# Patient Record
Sex: FEMALE | Race: WHITE | NOT HISPANIC OR LATINO | Employment: FULL TIME | URBAN - METROPOLITAN AREA
[De-identification: names, ages, dates, MRNs, and addresses within clinical notes are randomized per-mention and may not be internally consistent; named-entity substitution may affect disease eponyms.]

---

## 2018-02-13 ENCOUNTER — OFFICE VISIT (OUTPATIENT)
Dept: URGENT CARE | Facility: CLINIC | Age: 38
End: 2018-02-13
Payer: COMMERCIAL

## 2018-02-13 VITALS
HEIGHT: 65 IN | OXYGEN SATURATION: 99 % | SYSTOLIC BLOOD PRESSURE: 131 MMHG | BODY MASS INDEX: 29.69 KG/M2 | HEART RATE: 65 BPM | WEIGHT: 178.2 LBS | DIASTOLIC BLOOD PRESSURE: 94 MMHG | TEMPERATURE: 99.5 F | RESPIRATION RATE: 18 BRPM

## 2018-02-13 DIAGNOSIS — B00.1 HERPES LABIALIS: Primary | ICD-10-CM

## 2018-02-13 PROCEDURE — G0381 LEV 2 HOSP TYPE B ED VISIT: HCPCS | Performed by: FAMILY MEDICINE

## 2018-02-13 PROCEDURE — S9083 URGENT CARE CENTER GLOBAL: HCPCS | Performed by: FAMILY MEDICINE

## 2018-02-13 RX ORDER — VALACYCLOVIR HYDROCHLORIDE 1 G/1
1000 TABLET, FILM COATED ORAL 2 TIMES DAILY
Qty: 2 TABLET | Refills: 3 | Status: SHIPPED | OUTPATIENT
Start: 2018-02-13 | End: 2020-02-03 | Stop reason: ALTCHOICE

## 2018-02-13 NOTE — PROGRESS NOTES
[address] [place] [address] [address]     Patient Information: Deb Hanley  MRN: [id number] : 1980  Encounter: [id number]    HPI:  Patient presents with cold sores of both her upper and lower lip  She states symptoms started 1-2 days ago  She is concerned when because when she had cold sores in the past she had that with a sinus infection  She denies any significant head pressure  She has had a history of cold sores in the past   She also complains of a painful sore on her upper palate  She has been using saltwater gargles which has been helping slightly  She admits to smoking occasionally  Past history is otherwise unremarkable    Subjective:   Review of Systems   Constitutional: Negative  HENT: Negative for congestion, ear discharge, ear pain, hearing loss, rhinorrhea, sinus pain, sinus pressure, sore throat, tinnitus, trouble swallowing and voice change  Eyes: Negative  Respiratory: Negative  Cardiovascular: Negative  Gastrointestinal: Negative  Genitourinary: Negative  Musculoskeletal: Negative  Skin: Positive for rash  Cold sores   Neurological: Negative  Hematological: Negative  Objective:  /94 (BP Location: Right arm, Patient Position: Sitting, Cuff Size: Adult)   Pulse 65   Temp 99 5 °F (37 5 °C) (Tympanic)   Resp 18   Ht 5' 5" (1 651 m)   Wt 80 8 kg (178 lb 3 2 oz)   SpO2 99%   BMI 29 65 kg/m²   Physical Exam   Constitutional: She is oriented to person, place, and time  She appears well-developed  HENT:   Head: Normocephalic  Right Ear: External ear normal    Left Ear: External ear normal    Small aphthous ulcer noted roof of the mouth  Eyes: EOM are normal  Pupils are equal, round, and reactive to light  Neck: Normal range of motion  Neck supple  No thyromegaly present  Cardiovascular: Normal rate, regular rhythm, normal heart sounds and intact distal pulses  Pulmonary/Chest: Effort normal and breath sounds normal    Abdominal: Soft  Bowel sounds are normal  There is no tenderness  Musculoskeletal: Normal range of motion  Neurological: She is alert and oriented to person, place, and time  She has normal reflexes  Skin: Skin is warm and dry  Vesicular rash left upper lip and right lower lip consistent with herpes simplex  Psychiatric: She has a normal mood and affect  Vitals reviewed  Assessment:  Diagnoses and all orders for this visit:    Herpes labialis  -     valACYclovir (VALTREX) 1,000 mg tablet; Take 1 tablet (1,000 mg total) by mouth 2 (two) times a day for 1 day    Other orders  -     norethindrone-ethinyl estradiol-iron (ESTROSTEP FE) 1-20/1-30/1-35 MG-MCG TABS; Take 1 tablet by mouth daily        Plan:  Patient Instructions   Taking Valtrex as directed  May use saltwater gargles or Chloraseptic spray for mouth ulcers  Follow with your family doctor in 3-4 days if symptoms persist or worsen  Kyaelizabeth Guevara, DO  2/13/2018,10:17 AM    Portions of the record may have been created with voice recognition software   Occasional wrong word or "sound a like" substitutions may have occurred due to the inherent limitations of voice recognition software   Read the chart carefully and recognize, using context, where substitutions have occurred

## 2018-02-13 NOTE — PATIENT INSTRUCTIONS
Taking Valtrex as directed  May use saltwater gargles or Chloraseptic spray for mouth ulcers  Follow with your family doctor in 3-4 days if symptoms persist or worsen

## 2020-02-03 ENCOUNTER — OFFICE VISIT (OUTPATIENT)
Dept: URGENT CARE | Facility: CLINIC | Age: 40
End: 2020-02-03
Payer: COMMERCIAL

## 2020-02-03 VITALS
RESPIRATION RATE: 16 BRPM | HEART RATE: 76 BPM | OXYGEN SATURATION: 99 % | HEIGHT: 65 IN | BODY MASS INDEX: 30.82 KG/M2 | SYSTOLIC BLOOD PRESSURE: 134 MMHG | DIASTOLIC BLOOD PRESSURE: 74 MMHG | TEMPERATURE: 100.2 F | WEIGHT: 185 LBS

## 2020-02-03 DIAGNOSIS — B00.1 HERPES LABIALIS: ICD-10-CM

## 2020-02-03 DIAGNOSIS — J06.9 VIRAL URI: Primary | ICD-10-CM

## 2020-02-03 PROCEDURE — 99213 OFFICE O/P EST LOW 20 MIN: CPT | Performed by: EMERGENCY MEDICINE

## 2020-02-03 RX ORDER — VALACYCLOVIR HYDROCHLORIDE 1 G/1
1000 TABLET, FILM COATED ORAL 2 TIMES DAILY
Qty: 14 TABLET | Refills: 1 | Status: SHIPPED | OUTPATIENT
Start: 2020-02-03 | End: 2020-02-10

## 2020-02-03 RX ORDER — FEXOFENADINE HCL AND PSEUDOEPHEDRINE HCI 60; 120 MG/1; MG/1
1 TABLET, EXTENDED RELEASE ORAL 2 TIMES DAILY
COMMUNITY

## 2020-02-03 NOTE — PROGRESS NOTES
[facility]        NAME: Simone Essex is a 46 y o  female  : 1980    MRN: [id number]  DATE: 2020  TIME: 11:41 AM    Assessment and Plan   Viral URI [J06 9]  1  Viral URI     2  Herpes labialis  valACYclovir (VALTREX) 1,000 mg tablet         Patient Instructions     Patient Instructions     You have been diagnosed with a Viral Upper Respiratory infection and your symptoms should resolve over the next 7 to 10 days with the treatments recommended today  If they do not, it is possible that you have developed a bacterial infection and you should return  If you were to take an antibiotic while you are still in the viral stage, you will not get better any faster, but could kill off the good germs in your body as well as make the germs in you resistant to the antibiotic  Take an expectorant - guaifenesin should be the only ingredient - during the day, and the cough suppressant (ex  Robitussin DM or Tessalon) if needed at night only  Take Zinc 12 5 to 15 mg every 2 - 3 hrs while awake for the next few days  You may take Cold Geoffrey (13 3 mg of Zinc) or split a 25 mg Zinc tablet or lozenge in two or a 50 mg into four to get the proper dose  The total daily dose of Zinc should exceed 75 mg per day  You may also take a decongestant like Sudafed, unless you have hypertension or cardiac disease  Hold any NSAIDs like Ibuprofen (Advil), Naprosyn (Aleve), etc while on steroids like Medrol or Prednisone  If you are diabetic, you should also adhere strictly to your diet and monitor your blood sugar closely while on the steroids as discussed  Fever in Adults   AMBULATORY CARE:   A fever  is an increase in your body temperature  Normal body temperature is 98 6°F (37°C)  Fever is generally defined as greater than 100 4°F (38°C)  Common causes include an infection, injury, or disease such as arthritis    Other signs and symptoms may include any of the following:   · Chills and shivers     · Muscle stiffness    · Weight loss    · Night sweats    · Fever that comes and goes    · Fever that is higher in the morning  Seek care immediately if:   · Your fever does not go away or gets worse even after treatment  · You have a stiff neck and a bad headache  · You are confused  You may not be able to think clearly or remember things like you normally do  · Your heart beats faster than usual even after treatment  · You have shortness of breath or chest pain when you breathe  · You urinate small amounts or not at all  · Your skin, lips, or nails turn blue  Contact your healthcare provider if:   · You have abdominal pain or you feel bloated  · You have nausea or are vomiting  · You have pain or burning when you urinate, or you have pain in your back  · You have questions or concerns about your condition or care  Treatment for a fever  may include any of the following:  · NSAIDs , such as ibuprofen, help decrease swelling, pain, and fever  This medicine is available with or without a doctor's order  NSAIDs can cause stomach bleeding or kidney problems in certain people  If you take blood thinner medicine, always ask if NSAIDs are safe for you  Always read the medicine label and follow directions  Do not give these medicines to children under 10months of age without direction from your child's healthcare provider  · Acetaminophen  decreases pain and fever  It is available without a doctor's order  Ask how much to take and how often to take it  Follow directions  Read the labels of all other medicines you are using to see if they also contain acetaminophen, or ask your doctor or pharmacist  Acetaminophen can cause liver damage if not taken correctly  Do not use more than 4 grams (4,000 milligrams) total of acetaminophen in one day  · Antibiotics  may be given if you have an infection caused by bacteria  · Take your medicine as directed    Contact your healthcare provider if you think your medicine is not helping or if you have side effects  Tell him of her if you are allergic to any medicine  Keep a list of the medicines, vitamins, and herbs you take  Include the amounts, and when and why you take them  Bring the list or the pill bottles to follow-up visits  Carry your medicine list with you in case of an emergency  Self-care:   · Drink more liquids as directed  A fever makes you sweat  This can increase your risk for dehydration  Liquids can help prevent dehydration  ¨ Drink at least 6 to 8 eight-ounce cups of clear liquids each day  Drink water, juice, or broth  Do not drink sports drinks  They may contain caffeine  ¨ Ask your healthcare provider if you should drink an oral rehydration solution (ORS)  An ORS has the right amounts of water, salts, and sugar you need to replace body fluids  · Dress in lightweight clothes  Shivers may be a sign that your fever is rising  Do not put extra blankets or clothes on  This may cause your fever to rise even higher  Dress in light, comfortable clothing  Use a lightweight blanket or sheet when you sleep  Change your clothes, blanket, or sheets if they get wet  · Cool yourself safely  Take a bath in cool or lukewarm water  Use an ice pack wrapped in a small towel or wet a washcloth with cool water  Place the ice pack or wet washcloth on your forehead or the back of your neck  Follow up with your healthcare provider as directed:  Write down your questions so you remember to ask them during your visits  © 2017 [employer] Information is for End User's use only and may not be sold, redistributed or otherwise used for commercial purposes  All illustrations and images included in CareNotes® are the copyrighted property of [employer] , [employer]  or [employer] Martínez  The above information is an  only  It is not intended as medical advice for individual conditions or treatments   Talk to your doctor, nurse or pharmacist before following any medical regimen to see if it is safe and effective for you  Oral Herpes Simplex Virus Infections   WHAT YOU NEED TO KNOW:   Oral herpes simplex virus (HSV) infections cause sores to form on the mouth, lips, or gums  HSV has 2 types  Oral HSV infections are most often caused by HSV type 1  HSV type 2 normally affects the genital area, but may also occur in the mouth  After you are infected, the virus hides in your nerves and may return  An HSV infection that comes back is also known as a cold sore  DISCHARGE INSTRUCTIONS:   Medicines:   · Antiviral medicine: This decreases symptoms and shortens the amount of time blisters are present  You may also need to take it daily to prevent blisters  The medicine may be given as a liquid, pill, or ointment  Use as directed  · Numbing medicine: This decreases mouth pain  It is usually given as a mouth rinse  Use it before you eat or drink, or as directed  Follow up with your healthcare provider as directed:  Write down your questions so you remember to ask them during your visits  Self-care:   · Eat soft, bland foods:  Avoid salty, acidic, spicy, sharp-edged, and hard foods  Eat healthy foods to help healing  · Drink liquids:  Cool liquids may help soothe your mouth and numb the pain  Avoid citrus or carbonated drinks, such as orange or grapefruit juice, lemonade, or soda  These liquids may cause your mouth to hurt more  A straw may help if you have blisters on the lips or tongue  · Use ice:  Ice helps decrease swelling and pain  Drink cold water or suck on ice to help decrease pain on your tongue or inside your mouth  Use an ice pack, or put crushed ice in a plastic bag on your lip  Cover it with a towel and place it on your lip for 15 to 20 minutes every hour or as directed    Prevent the spread of the herpes simplex virus:   · Do not have close contact with people until the blisters heal  This includes touching, kissing, and oral sex  · Do not get close to babies or to people who are sick while you have cold sores  · Do not share eating utensils, towels, lip balm, or makeup with another person  · Do not touch the blisters or pick at the scabs  Do not touch other body parts, especially your eyes or genitals without washing your hands first  Wash your hands often  Contact your healthcare provider if:   · You have a fever  · Your symptoms become worse or do not improve a week after you start treatment  · You have difficulty eating or drinking because of the pain in your mouth  · You get a headache, are nauseated, or vomit  · Your eyes feel irritated, or you feel like you have something in your eye  · Your skin becomes itchy, swollen, or develops a rash after you take your medicine  · You have questions or concerns about your condition or care  Return to the emergency department if:   · You get a fever, feel achy, or see pus instead of clear fluid in the sores  · You get sores on your eyes  · You have abdominal pain, a severe headache, or confusion  · You get new symptoms, or old symptoms return after you have been treated  © 2017 [employer]  Information is for End User's use only and may not be sold, redistributed or otherwise used for commercial purposes  All illustrations and images included in CareNotes® are the copyrighted property of [employer] , [employer]  or Shadi Martínez  The above information is an  only  It is not intended as medical advice for individual conditions or treatments  Talk to your doctor, nurse or pharmacist before following any medical regimen to see if it is safe and effective for you  Follow up with PCP in 3-5 days  Proceed to  ER if symptoms worsen      Chief Complaint     Chief Complaint   Patient presents with    Cough     fever since yesterday, scratchy throat         History of Present Illness       Patient with fevers sore throat and congestion since yesterday  She also complains of recurrence of cold sores on her lower lip that occurs when she gets viral infections  Review of Systems   Review of Systems   Constitutional: Negative for chills and fever  HENT: Positive for congestion, rhinorrhea, sinus pressure and sore throat  Negative for trouble swallowing and voice change  Respiratory: Positive for cough  Negative for chest tightness, shortness of breath and wheezing  Cardiovascular: Negative for chest pain  Current Medications       Current Outpatient Medications:     fexofenadine-pseudoephedrine (ALLEGRA-D)  MG per tablet, Take 1 tablet by mouth 2 (two) times a day, Disp: , Rfl:     norethindrone-ethinyl estradiol-iron (ESTROSTEP FE) 1-20/1-30/1-35 MG-MCG TABS, Take 1 tablet by mouth daily, Disp: , Rfl:     valACYclovir (VALTREX) 1,000 mg tablet, Take 1 tablet (1,000 mg total) by mouth 2 (two) times a day for 7 days, Disp: 14 tablet, Rfl: 1    Current Allergies     Allergies as of 2020 - Reviewed 2020   Allergen Reaction Noted    Doxycycline GI Intolerance 2020            The following portions of the patient's history were reviewed and updated as appropriate: allergies, current medications, past family history, past medical history, past social history, past surgical history and problem list      Past Medical History:   Diagnosis Date    Known health problems: none        Past Surgical History:   Procedure Laterality Date     SECTION      DILATION AND CURETTAGE OF UTERUS      x11       Family History   Problem Relation Age of Onset    No Known Problems Mother     Liver disease Father          Medications have been verified          Objective   /74   Pulse 76   Temp 100 2 °F (37 9 °C)   Resp 16   Ht 5' 5" (1 651 m)   Wt 83 9 kg (185 lb)   LMP 01/10/2020   SpO2 99%   BMI 30 79 kg/m²        Physical Exam     Physical Exam   Constitutional: She is oriented to person, place, and time  She appears well-developed and well-nourished  No distress  HENT:   Head: Normocephalic and atraumatic  Right Ear: Tympanic membrane and external ear normal    Left Ear: Tympanic membrane and external ear normal    Nose: Mucosal edema present  Mouth/Throat: Posterior oropharyngeal erythema present  No oropharyngeal exudate or tonsillar abscesses  Nasal mucosa congested, oropharynx mildly erythematous  Neck: Neck supple  Cardiovascular: Normal rate and regular rhythm  Pulmonary/Chest: Effort normal  No respiratory distress  She has no wheezes  She has no rales  Neurological: She is alert and oriented to person, place, and time  Skin: Skin is warm and dry  Papulovesicular lesions on lower lip   Psychiatric: She has a normal mood and affect  Her behavior is normal  Judgment and thought content normal    Nursing note and vitals reviewed

## 2020-02-03 NOTE — PATIENT INSTRUCTIONS
You have been diagnosed with a Viral Upper Respiratory infection and your symptoms should resolve over the next 7 to 10 days with the treatments recommended today  If they do not, it is possible that you have developed a bacterial infection and you should return  If you were to take an antibiotic while you are still in the viral stage, you will not get better any faster, but could kill off the good germs in your body as well as make the germs in you resistant to the antibiotic  Take an expectorant - guaifenesin should be the only ingredient - during the day, and the cough suppressant (ex  Robitussin DM or Tessalon) if needed at night only  Take Zinc 12 5 to 15 mg every 2 - 3 hrs while awake for the next few days  You may take Cold Geoffrey (13 3 mg of Zinc) or split a 25 mg Zinc tablet or lozenge in two or a 50 mg into four to get the proper dose  The total daily dose of Zinc should exceed 75 mg per day  You may also take a decongestant like Sudafed, unless you have hypertension or cardiac disease  Hold any NSAIDs like Ibuprofen (Advil), Naprosyn (Aleve), etc while on steroids like Medrol or Prednisone  If you are diabetic, you should also adhere strictly to your diet and monitor your blood sugar closely while on the steroids as discussed  Fever in Adults   AMBULATORY CARE:   A fever  is an increase in your body temperature  Normal body temperature is 98 6°F (37°C)  Fever is generally defined as greater than 100 4°F (38°C)  Common causes include an infection, injury, or disease such as arthritis  Other signs and symptoms may include any of the following:   · Chills and shivers     · Muscle stiffness    · Weight loss    · Night sweats    · Fever that comes and goes    · Fever that is higher in the morning  Seek care immediately if:   · Your fever does not go away or gets worse even after treatment  · You have a stiff neck and a bad headache  · You are confused   You may not be able to think clearly or remember things like you normally do  · Your heart beats faster than usual even after treatment  · You have shortness of breath or chest pain when you breathe  · You urinate small amounts or not at all  · Your skin, lips, or nails turn blue  Contact your healthcare provider if:   · You have abdominal pain or you feel bloated  · You have nausea or are vomiting  · You have pain or burning when you urinate, or you have pain in your back  · You have questions or concerns about your condition or care  Treatment for a fever  may include any of the following:  · NSAIDs , such as ibuprofen, help decrease swelling, pain, and fever  This medicine is available with or without a doctor's order  NSAIDs can cause stomach bleeding or kidney problems in certain people  If you take blood thinner medicine, always ask if NSAIDs are safe for you  Always read the medicine label and follow directions  Do not give these medicines to children under 10months of age without direction from your child's healthcare provider  · Acetaminophen  decreases pain and fever  It is available without a doctor's order  Ask how much to take and how often to take it  Follow directions  Read the labels of all other medicines you are using to see if they also contain acetaminophen, or ask your doctor or pharmacist  Acetaminophen can cause liver damage if not taken correctly  Do not use more than 4 grams (4,000 milligrams) total of acetaminophen in one day  · Antibiotics  may be given if you have an infection caused by bacteria  · Take your medicine as directed  Contact your healthcare provider if you think your medicine is not helping or if you have side effects  Tell him of her if you are allergic to any medicine  Keep a list of the medicines, vitamins, and herbs you take  Include the amounts, and when and why you take them  Bring the list or the pill bottles to follow-up visits   Carry your medicine list with you in case of an emergency  Self-care:   · Drink more liquids as directed  A fever makes you sweat  This can increase your risk for dehydration  Liquids can help prevent dehydration  ¨ Drink at least 6 to 8 eight-ounce cups of clear liquids each day  Drink water, juice, or broth  Do not drink sports drinks  They may contain caffeine  ¨ Ask your healthcare provider if you should drink an oral rehydration solution (ORS)  An ORS has the right amounts of water, salts, and sugar you need to replace body fluids  · Dress in lightweight clothes  Shivers may be a sign that your fever is rising  Do not put extra blankets or clothes on  This may cause your fever to rise even higher  Dress in light, comfortable clothing  Use a lightweight blanket or sheet when you sleep  Change your clothes, blanket, or sheets if they get wet  · Cool yourself safely  Take a bath in cool or lukewarm water  Use an ice pack wrapped in a small towel or wet a washcloth with cool water  Place the ice pack or wet washcloth on your forehead or the back of your neck  Follow up with your healthcare provider as directed:  Write down your questions so you remember to ask them during your visits  © 2017 [facility] Information is for End User's use only and may not be sold, redistributed or otherwise used for commercial purposes  All illustrations and images included in CareNotes® are the copyrighted property of [employer]  or Shadi Martínez  The above information is an  only  It is not intended as medical advice for individual conditions or treatments  Talk to your doctor, nurse or pharmacist before following any medical regimen to see if it is safe and effective for you  Oral Herpes Simplex Virus Infections   WHAT YOU NEED TO KNOW:   Oral herpes simplex virus (HSV) infections cause sores to form on the mouth, lips, or gums  HSV has 2 types   Oral HSV infections are most often caused by HSV type 1  HSV type 2 normally affects the genital area, but may also occur in the mouth  After you are infected, the virus hides in your nerves and may return  An HSV infection that comes back is also known as a cold sore  DISCHARGE INSTRUCTIONS:   Medicines:   · Antiviral medicine: This decreases symptoms and shortens the amount of time blisters are present  You may also need to take it daily to prevent blisters  The medicine may be given as a liquid, pill, or ointment  Use as directed  · Numbing medicine: This decreases mouth pain  It is usually given as a mouth rinse  Use it before you eat or drink, or as directed  Follow up with your healthcare provider as directed:  Write down your questions so you remember to ask them during your visits  Self-care:   · Eat soft, bland foods:  Avoid salty, acidic, spicy, sharp-edged, and hard foods  Eat healthy foods to help healing  · Drink liquids:  Cool liquids may help soothe your mouth and numb the pain  Avoid citrus or carbonated drinks, such as orange or grapefruit juice, lemonade, or soda  These liquids may cause your mouth to hurt more  A straw may help if you have blisters on the lips or tongue  · Use ice:  Ice helps decrease swelling and pain  Drink cold water or suck on ice to help decrease pain on your tongue or inside your mouth  Use an ice pack, or put crushed ice in a plastic bag on your lip  Cover it with a towel and place it on your lip for 15 to 20 minutes every hour or as directed  Prevent the spread of the herpes simplex virus:   · Do not have close contact with people until the blisters heal  This includes touching, kissing, and oral sex  · Do not get close to babies or to people who are sick while you have cold sores  · Do not share eating utensils, towels, lip balm, or makeup with another person  · Do not touch the blisters or pick at the scabs   Do not touch other body parts, especially your eyes or genitals without washing your hands first  Wash your hands often  Contact your healthcare provider if:   · You have a fever  · Your symptoms become worse or do not improve a week after you start treatment  · You have difficulty eating or drinking because of the pain in your mouth  · You get a headache, are nauseated, or vomit  · Your eyes feel irritated, or you feel like you have something in your eye  · Your skin becomes itchy, swollen, or develops a rash after you take your medicine  · You have questions or concerns about your condition or care  Return to the emergency department if:   · You get a fever, feel achy, or see pus instead of clear fluid in the sores  · You get sores on your eyes  · You have abdominal pain, a severe headache, or confusion  · You get new symptoms, or old symptoms return after you have been treated  © 2017 [facility] Information is for End User's use only and may not be sold, redistributed or otherwise used for commercial purposes  All illustrations and images included in CareNotes® are the copyrighted property of [employer]  or Shadi Martínez  The above information is an  only  It is not intended as medical advice for individual conditions or treatments  Talk to your doctor, nurse or pharmacist before following any medical regimen to see if it is safe and effective for you

## 2020-02-03 NOTE — LETTER
February 3, 2020     Patient: Shiv Garcia   YOB: 1980   Date of Visit: 2/3/2020       To Whom it May Concern:    Shiv Garcia was seen in my clinic on 2/3/2020  She may return to work on 02/04/2020  If you have any questions or concerns, please don't hesitate to call           Sincerely,          Mehreen Goel MD        CC: No Recipients

## 2023-02-22 ENCOUNTER — OFFICE VISIT (OUTPATIENT)
Dept: BARIATRICS | Facility: CLINIC | Age: 43
End: 2023-02-22

## 2023-02-22 VITALS
HEIGHT: 64 IN | SYSTOLIC BLOOD PRESSURE: 126 MMHG | DIASTOLIC BLOOD PRESSURE: 80 MMHG | WEIGHT: 213.4 LBS | BODY MASS INDEX: 36.43 KG/M2 | HEART RATE: 92 BPM

## 2023-02-22 DIAGNOSIS — I10 BENIGN ESSENTIAL HTN: ICD-10-CM

## 2023-02-22 DIAGNOSIS — Z91.89 AT RISK FOR SLEEP APNEA: ICD-10-CM

## 2023-02-22 DIAGNOSIS — E66.9 OBESITY, CLASS II, BMI 35-39.9: Primary | ICD-10-CM

## 2023-02-22 DIAGNOSIS — K21.9 GERD (GASTROESOPHAGEAL REFLUX DISEASE): ICD-10-CM

## 2023-02-22 DIAGNOSIS — R73.01 ELEVATED FASTING GLUCOSE: ICD-10-CM

## 2023-02-22 PROBLEM — Z15.89 HOMOZYGOUS MTHFR MUTATION C677T: Status: ACTIVE | Noted: 2017-02-28

## 2023-02-22 PROBLEM — E78.2 MODERATE MIXED HYPERLIPIDEMIA NOT REQUIRING STATIN THERAPY: Status: ACTIVE | Noted: 2022-06-03

## 2023-02-22 PROBLEM — E66.812 OBESITY, CLASS II, BMI 35-39.9: Status: ACTIVE | Noted: 2023-02-22

## 2023-02-22 RX ORDER — OMEPRAZOLE 20 MG/1
CAPSULE, DELAYED RELEASE ORAL
COMMUNITY
Start: 2023-02-13

## 2023-02-22 RX ORDER — DESOXIMETASONE 2.5 MG/G
OINTMENT TOPICAL
COMMUNITY
Start: 2023-01-31

## 2023-02-22 RX ORDER — LEVONORGESTREL / ETHINYL ESTRADIOL 0.15-0.03
KIT ORAL
COMMUNITY
Start: 2023-01-14

## 2023-02-22 RX ORDER — LISINOPRIL 10 MG/1
TABLET ORAL
COMMUNITY
Start: 2023-01-17

## 2023-02-22 NOTE — ASSESSMENT & PLAN NOTE
- Taking lisinopril  May improve with weight loss and lifestyle modification  Continue management with prescribing provider

## 2023-02-22 NOTE — ASSESSMENT & PLAN NOTE
- Discussed options of HealthyCORE-Intensive Lifestyle Intervention Program, Very Low Calorie Diet-VLCD, Conservative Program, Gladis-En-Y Gastric Bypass and Vertical Sleeve Gastrectomy and the role of weight loss medications   - Not currently interested in weight loss surgery, but might consider in the future  - Patient is interested in pursuing Conservative Program   -She is interested in weight loss medications  She will check coverage of weight loss medications and update me   -She denies history of seizures or kidney stones   -She was recently diagnosed with glaucoma and will be starting eyedrops  She follows with Dr Bola Hernandez of [facility] [place]  Would need clearance from him before considering Topamax or Wellbutrin  - Patient denies personal history of pancreatitis  Patient also denies personal and family history of thyroid cancer and multiple endocrine neoplasia type 2 (MEN 2 tumor)  - Initial weight loss goal of 5-10% weight loss for improved health  - Weight loss can improve patient's co-morbid conditions and/or prevent weight-related complications  - On OCP  - Stop Willodean Chick 4/8  - Labs reviewed: Lipid panel and CMP completed December 29, 2022  Cholesterol, triglycerides, LDL, and glucose elevated, HDL low, which may all improve with weight loss and cardiovascular exercise  The remainder of the blood work was within acceptable range  TSH completed May 18, 2022 was within normal limits   -Check A1c and fasting insulin  Goals:  Do not skip meals  Food log (ie ) www myfitnesspal com,sparkpeople  com,loseit com,calorieking  com,etc  baritastic (use skinnytaste  com, dietdoctor  com or smartphone rosa [employer] for recipes)  No sugary beverages  At least 64oz of water daily  Increase physical activity by 10 minutes daily   Gradually increase physical activity to a goal of 5 days per week for 30 minutes of MODERATE intensity PLUS 2 days per week of FULL BODY resistance training (use smartphone apps FitON, Home Workout, etc )  Start logging, weighing, and measuring food, including alcohol  Be mindful of alcohol intake  1200 jeffy/day  Sample menu given  Keep up the great work with water intake  Start exercise, such as walking 2 days/week for 10 minutes with gradual increase to 5 days/week for 30 minutes  Smoking cessation recommended for her general health and wellbeing

## 2023-02-22 NOTE — ASSESSMENT & PLAN NOTE
- Stop bang 4/8    - Risks of untreated sleep apnea reviewed, including increased risk for myocardial infarction and stroke, increased difficulty with weight loss, and risk of sudden cardiac death due to arrhythmia   - Sleep medicine referral declined  She will contact me if she changes her mind

## 2023-02-22 NOTE — ASSESSMENT & PLAN NOTE
- Taking Prilosec  May improve with weight loss and lifestyle modification  Continue management with prescribing provider

## 2023-02-22 NOTE — PROGRESS NOTES
Assessment/Plan:    Obesity, Class II, BMI 35-39 9  - Discussed options of HealthyCORE-Intensive Lifestyle Intervention Program, Very Low Calorie Diet-VLCD, Conservative Program, Gladis-En-Y Gastric Bypass and Vertical Sleeve Gastrectomy and the role of weight loss medications   - Not currently interested in weight loss surgery, but might consider in the future  - Patient is interested in pursuing Conservative Program   -She is interested in weight loss medications  She will check coverage of weight loss medications and update me   -She denies history of seizures or kidney stones   -She was recently diagnosed with glaucoma and will be starting eyedrops  She follows with Dr Dandre Burgos of [facility] [place]  Would need clearance from him before considering Topamax or Wellbutrin  - Patient denies personal history of pancreatitis  Patient also denies personal and family history of thyroid cancer and multiple endocrine neoplasia type 2 (MEN 2 tumor)  - Initial weight loss goal of 5-10% weight loss for improved health  - Weight loss can improve patient's co-morbid conditions and/or prevent weight-related complications  - On OCP  - Stop Marcial Scheuermann 4/8  - Labs reviewed: Lipid panel and CMP completed December 29, 2022  Cholesterol, triglycerides, LDL, and glucose elevated, HDL low, which may all improve with weight loss and cardiovascular exercise  The remainder of the blood work was within acceptable range  TSH completed May 18, 2022 was within normal limits   -Check A1c and fasting insulin  Goals:  Do not skip meals  Food log (ie ) www myfitnesspal com,sparkpeople  com,loseit com,calorieking  com,etc  baritastic (use skinnytaste  com, dietdoctor  com or smartphone rosa Verbling for recipes)  No sugary beverages  At least 64oz of water daily  Increase physical activity by 10 minutes daily   Gradually increase physical activity to a goal of 5 days per week for 30 minutes of MODERATE intensity PLUS 2 days per week of FULL BODY resistance training (use smartphone apps FitON, Home Workout, etc )  Start logging, weighing, and measuring food, including alcohol  Be mindful of alcohol intake  1200 jeffy/day  Sample menu given  Keep up the great work with water intake  Start exercise, such as walking 2 days/week for 10 minutes with gradual increase to 5 days/week for 30 minutes  Smoking cessation recommended for her general health and wellbeing  Benign essential HTN  - Taking lisinopril  May improve with weight loss and lifestyle modification  Continue management with prescribing provider  GERD (gastroesophageal reflux disease)  - Taking Prilosec  May improve with weight loss and lifestyle modification  Continue management with prescribing provider  At risk for sleep apnea  - Stop bang 4/8    - Risks of untreated sleep apnea reviewed, including increased risk for myocardial infarction and stroke, increased difficulty with weight loss, and risk of sudden cardiac death due to arrhythmia   - Sleep medicine referral declined  She will contact me if she changes her mind  Marialuisa Jean was seen today for follow-up  Diagnoses and all orders for this visit:    Obesity, Class II, BMI 35-39 9  -     HEMOGLOBIN A1C W/ EAG ESTIMATION; Future  -     Insulin, fasting; Future    Elevated fasting glucose  -     HEMOGLOBIN A1C W/ EAG ESTIMATION; Future  -     Insulin, fasting; Future    Benign essential HTN    GERD (gastroesophageal reflux disease)    At risk for sleep apnea            Follow up in approximately 3 months with Non-Surgical Physician/Advanced Practitioner  Subjective:   Chief Complaint   Patient presents with   • Follow-up       Patient ID: Zenia Alejandre  is a 40 y o  female with excess weight/obesity here to pursue weight management  Previous notes and records have been reviewed      Past Medical History:   Diagnosis Date   • Hypertension    • Known health problems: none      Past Surgical History:   Procedure Laterality Date   •  SECTION     • DILATION AND CURETTAGE OF UTERUS      x11       HPI:  Wt Readings from Last 20 Encounters:   23 96 8 kg (213 lb 6 4 oz)   20 83 9 kg (185 lb)   18 80 8 kg (178 lb 3 2 oz)     Obesity/Excess Weight:  Severity: Moderate  Onset:  Since about , but worse past 2 years    Modifiers: Diet and Exercise and shakes and Be Balance program  Contributing factors: Poor Food Choices, Pregnancy and PCOS  Associated symptoms: decreased self esteem, increased shortness of breath and clothes do not fit    Hydration: 90 oz water, sometimes crystal light  Alcohol: 10 drinks per week   Smoking: cigarettes 5-10 per week  Exercise: none  Occupation: working from home    Sleep: 8 hours  STOP ban/8    Highest weight: current  Current weight: 213 4 lbs BMI 36 40  Goal weight: just to be comfortable     Colonoscopy: N/A  Mammogram: UTD, due Aug 2023, gets through [facility]    The following portions of the patient's history were reviewed and updated as appropriate: allergies, current medications, past family history, past medical history, past social history, past surgical history, and problem list     Family History   Problem Relation Age of Onset   • Hypertension Mother    • Hypertension Father    • Diabetes Father    • Liver disease Father    • Stroke Father    • Hypertension Sister    • Breast cancer Maternal Grandmother    • Lung cancer Maternal Grandfather    • Cirrhosis Maternal Grandfather    • Heart disease Paternal Grandmother    • Colon cancer Paternal Grandmother    • Thyroid disease Neg Hx         Review of Systems   HENT: Negative for sore throat  Respiratory: Positive for cough (feels it is related to GERD)  Negative for shortness of breath  Cardiovascular: Negative for chest pain and palpitations     Gastrointestinal: Negative for constipation, diarrhea, nausea and vomiting         + GERD improving with medication Endocrine: Positive for heat intolerance (comes and goes)  Negative for cold intolerance  Genitourinary: Negative for dysuria  Musculoskeletal: Positive for arthralgias (intermittent) and back pain (intermittent )  Skin: Negative for rash  Neurological: Negative for headaches  Psychiatric/Behavioral: Negative for suicidal ideas (or HI)  + depression and anxiety controlled       Objective:  /80   Pulse 92   Ht 5' 4 2" (1 631 m)   Wt 96 8 kg (213 lb 6 4 oz)   BMI 36 40 kg/m²     Physical Exam  Vitals and nursing note reviewed  Constitutional   General appearance: Abnormal   well developed and obese  Eyes No conjunctival injection  Ears, Nose, Mouth, and Throat Oral mucosa moist    Pulmonary   Respiratory effort: No increased work of breathing or signs of respiratory distress  Cardiovascular     Examination of extremities for edema and/or varicosities: Normal   no edema  Abdomen   Abdomen: Abnormal   The abdomen was obese      Musculoskeletal   Normal range of motion  Neurological   Gait and station: Normal     Psychiatric   Orientation to person, place and time: Normal     Affect: appropriate

## 2023-02-23 ENCOUNTER — APPOINTMENT (OUTPATIENT)
Dept: LAB | Facility: CLINIC | Age: 43
End: 2023-02-23

## 2023-02-23 ENCOUNTER — PATIENT MESSAGE (OUTPATIENT)
Dept: BARIATRICS | Facility: CLINIC | Age: 43
End: 2023-02-23

## 2023-02-23 DIAGNOSIS — R73.01 ELEVATED FASTING GLUCOSE: ICD-10-CM

## 2023-02-23 DIAGNOSIS — E66.9 OBESITY, CLASS II, BMI 35-39.9: ICD-10-CM

## 2023-02-23 DIAGNOSIS — Z91.89 AT RISK FOR SLEEP APNEA: Primary | ICD-10-CM

## 2023-02-23 LAB
EST. AVERAGE GLUCOSE BLD GHB EST-MCNC: 80 MG/DL
HBA1C MFR BLD: 4.4 %
INSULIN SERPL-ACNC: 13.3 MU/L (ref 3–25)

## 2023-02-23 NOTE — TELEPHONE ENCOUNTER
Camron Lawrence 2/23/2023 2:11 PM EST      ----- Message -----  From: Love Stefan  Sent: 2/23/2023 2:02 PM EST  To: , *  Subject: follow up     Good Afternoon, I got the bloodwork done this morning  My insurance doesn't cover either medication and it would be $1500 per month out of pocket  I was able to sign up for a savings card on [employer] but the link for [facility] PAT didn't go through  I'm not sure how much it would be with the savings card though  I will go ahead and do the sleep study if you could get that process started  I called my insurance regarding the surgery and the girl I talked to couldn't find any prerequisites to having it done so I guess I would need to make an appt with them to start that process and go from there once I have the sleep study done

## 2023-02-27 ENCOUNTER — TELEPHONE (OUTPATIENT)
Dept: BARIATRICS | Facility: CLINIC | Age: 43
End: 2023-02-27

## 2023-02-27 NOTE — TELEPHONE ENCOUNTER
----- Message from Sabina Valdaez sent at 2/24/2023  5:04 PM EST -----  Please let the patient know I have reviewed her A1C and fasting insulin and they were both within normal range

## 2023-03-22 ENCOUNTER — OFFICE VISIT (OUTPATIENT)
Dept: BARIATRICS | Facility: CLINIC | Age: 43
End: 2023-03-22

## 2023-03-22 VITALS
DIASTOLIC BLOOD PRESSURE: 90 MMHG | HEART RATE: 74 BPM | BODY MASS INDEX: 35.7 KG/M2 | HEIGHT: 65 IN | WEIGHT: 214.3 LBS | SYSTOLIC BLOOD PRESSURE: 120 MMHG

## 2023-03-22 DIAGNOSIS — E66.9 OBESITY, UNSPECIFIED: Primary | ICD-10-CM

## 2023-03-22 DIAGNOSIS — E66.9 OBESITY, CLASS II, BMI 35-39.9: Primary | ICD-10-CM

## 2023-03-22 RX ORDER — FEXOFENADINE HCL 180 MG/1
TABLET ORAL
COMMUNITY
Start: 2022-07-21

## 2023-03-22 RX ORDER — FLUTICASONE PROPIONATE 50 MCG
2 SPRAY, SUSPENSION (ML) NASAL
COMMUNITY

## 2023-03-22 RX ORDER — LATANOPROST 50 UG/ML
SOLUTION/ DROPS OPHTHALMIC
COMMUNITY
Start: 2023-02-23

## 2023-03-22 RX ORDER — DESOXIMETASONE 2.5 MG/G
1 OINTMENT TOPICAL
COMMUNITY
Start: 2023-01-31 | End: 2024-01-31

## 2023-03-22 RX ORDER — CETIRIZINE HYDROCHLORIDE, PSEUDOEPHEDRINE HYDROCHLORIDE 5; 120 MG/1; MG/1
TABLET, FILM COATED, EXTENDED RELEASE ORAL
COMMUNITY

## 2023-03-22 RX ORDER — OMEPRAZOLE 20 MG/1
20 TABLET, DELAYED RELEASE ORAL
COMMUNITY
Start: 2023-01-04

## 2023-03-22 NOTE — PROGRESS NOTES
Bariatric Nutrition Assessment Note    Type of surgery    Preop  Surgery Date: TBD  Surgeon: Dr Rosibel Ruiz  40 y o   female     Wt with BMI of 25: 148#  Pre-Op Excess Wt: 66#  Blood pressure 120/90, pulse 74, height 5' 4 5" (1 638 m), weight 97 2 kg (214 lb 4 8 oz)  Body mass index is 36 22 kg/m²     [place] Jeor Equation-2150 kcals to maintain wt                        Estimated protein needs 67-80g                        Estimated fluid needs >80 oz    Weight History   Onset of Obesity: Adult  Family history of obesity: Yes  Wt Loss Attempts: FAD Diets (Cabbage soup, Grapefruit, Cleanse, etc )  Meal Replacements (Medifast, Slim Fast, etc )  Self Created Diets (Portion Control, Healthy Food Choices, etc )  Maximum Wt Lost: 20# on Be Balanced (like Keto)    Review of History and Medications   Past Medical History:   Diagnosis Date   • Hypertension    • Known health problems: none      Past Surgical History:   Procedure Laterality Date   •  SECTION     • DILATION AND CURETTAGE OF UTERUS      x11     Social History     Socioeconomic History   • Marital status: /Civil Union     Spouse name: None   • Number of children: None   • Years of education: None   • Highest education level: None   Occupational History   • None   Tobacco Use   • Smoking status: Some Days     Types: Cigarettes   • Smokeless tobacco: Never   Vaping Use   • Vaping Use: Some days   Substance and Sexual Activity   • Alcohol use: Yes     Comment: SOCIAL   • Drug use: Never   • Sexual activity: None   Other Topics Concern   • None   Social History Narrative   • None     Social Determinants of Health     Financial Resource Strain: Not on file   Food Insecurity: Not on file   Transportation Needs: Not on file   Physical Activity: Not on file   Stress: Not on file   Social Connections: Not on file   Intimate Partner Violence: Not on file   Housing Stability: Not on file       Current Outpatient Medications:   •  desoximetasone (TOPICORT) 0 25 % ointment, , Disp: , Rfl:   •  fexofenadine (ALLEGRA) 180 MG tablet, , Disp: , Rfl:   •  fluticasone (FLONASE) 50 mcg/act nasal spray, 2 sprays, Disp: , Rfl:   •  Jolessa 0 15-0 03 MG per tablet, , Disp: , Rfl:   •  latanoprost (XALATAN) 0 005 % ophthalmic solution, , Disp: , Rfl:   •  lisinopril (ZESTRIL) 10 mg tablet, , Disp: , Rfl:   •  omeprazole (PriLOSEC OTC) 20 MG tablet, Take 20 mg by mouth, Disp: , Rfl:   •  cetirizine-pseudoephedrine (ZyrTEC-D) 5-120 MG per tablet, , Disp: , Rfl:   •  desoximetasone (TOPICORT) 0 25 % ointment, Apply 1 application  topically (Patient not taking: Reported on 3/22/2023), Disp: , Rfl:   •  fexofenadine-pseudoephedrine (ALLEGRA-D)  MG per tablet, Take 1 tablet by mouth 2 (two) times a day, Disp: , Rfl:   •  norethindrone-ethinyl estradiol-iron (ESTROSTEP FE) 1-20/1-30/1-35 MG-MCG TABS, Take 1 tablet by mouth daily, Disp: , Rfl:   •  omeprazole (PriLOSEC) 20 mg delayed release capsule, , Disp: , Rfl:   •  valACYclovir (VALTREX) 1,000 mg tablet, Take 1 tablet (1,000 mg total) by mouth 2 (two) times a day for 7 days, Disp: 14 tablet, Rfl: 1  Food Intake and Lifestyle Assessment   Food Intake Assessment completed via usual diet recall  Breakfast: skips, trying Protein shakes (Fairlife)  Snack: 0   Lunch: leftovers or rotisserie chicken on wrap, or chicken salad on crackers  Snack: 0 or [place] yogurt  Dinner: protein and vegetable, occ starch  Snack: 0  Beverage intake: water  Protein supplement: sometimes a Fairlife  Estimated protein intake per day: 80g on day of shake  Estimated fluid intake per day: 90 oz  Meals eaten away from home: at least 3 times/week  Typical meal pattern: 2-3 meals per day and 0-1 snacks per day  Eating Behaviors: Large portion sizes  Food allergies or intolerances:    Allergies   Allergen Reactions   • Doxycycline GI Intolerance     Cultural or Restorationist considerations: N/A    Physical Assessment  Physical Activity  Types of exercise: None  Current physical limitations: none    Psychosocial Assessment   Support systems: spouse sister in law(had WLS 1 year ago)  Socioeconomic factors: Lives with  and son, works from home    Nutrition Diagnosis  Diagnosis: Overweight / Obesity (NC-3 3)  Related to: Physical inactivity and Excessive energy intake  As Evidenced by: BMI >25     Nutrition Prescription: Recommend the following diet  Low fat, Low sugar and High protein    Interventions and Teaching   Discussed pre-op and post-op nutrition guidelines  Patient educated and handouts provided  Surgical changes to stomach / GI  Capacity of post-surgery stomach  Diet progression  Adequate hydration  Sugar and fat restriction to decrease "dumping syndrome"  Fat restriction to decrease steatorrhea  Expected weight loss  Weight loss plateaus/ possibility of weight regain  Exercise  Suggestions for pre-op diet  Nutrition considerations after surgery  Protein supplements  Meal planning and preparation  Appropriate carbohydrate, protein, and fat intake, and food/fluid choices to maximize safe weight loss, nutrient intake, and tolerance   Dietary and lifestyle changes  Possible problems with poor eating habits  Intuitive eating  Techniques for self monitoring and keeping daily food journal  Potential for food intolerance after surgery, and ways to deal with them including: lactose intolerance, nausea, reflux, vomiting, diarrhea, food intolerance, appetite changes, gas  Vitamin / Mineral supplementation of Multivitamin with minerals and Vitamin D  Post-operative pregnancy guidelines    Education provided to: patient    Barriers to learning: No barriers identified  Readiness to change: preparation    Prior research on procedure: discussed with provider    Comprehension: verbalizes understanding     Expected Compliance: good  Recommendations  Pt is an appropriate candidate for surgery   Yes  Evaluation / Monitoring  Dietitian to Monitor: Eating pattern as discussed Body weight Lab values Physical activity    Goals  Food journal, Complete lession plans 1-6, Eat 3 meals per day and exercise 20 minutes 3 times/week    Time Spent:   1 Hour

## 2023-03-22 NOTE — PROGRESS NOTES
Bariatric Behavioral Health Evaluation  Presenting Problem:  Aminta Lim  is a 40 y o    female   :  1980   Patient presented with overall concerns of obesity  Stated that weight has impacted quality of life and concerned with lack of mobility, chronic pain, and overall health  Patient has attempted various weight loss plans in the past        Patient is Interested in exploring bariatric surgery as an option for  weight loss goals to improve health, increase activity and prevent family disease  YES     Realizes Post- Op Requirements? Yes:  And will learn more through the program while meeting with the dietitian and the   Pre-morbid level of function and history of present illness: Patient has health issues  Living situation: Lives with her spouse and teenage son  Work:  for [employer] that used to be family owned  She works from home  Physical Activity: currently sedentary but is looking forward to hiking and kayaking  Family History:  (Traditions, culture, rules/routines around food): Grew up in [facility] [place] [facility] with 2 older sisters  [place] pedestrian friendly town and she enjoyed active fun  [place] traditions, Lots of starches and sweets  Clean your plate  Current eating habits: Skips breakfast, eats lunch, dinner and swerets after dinner  Tends to overeat rather than save leftovers, cant decide what she wants so over-orders at restraurants  Support system: her sister-in -law, being private about her plan  Spouse is supportive, and parents  Stressed by: traumatic situations in history  Positive experience with counseling  Family history of Obesity: No   Substance abuse: No   Mental health issues: No     Mental Health, Trauma and Substance use Assessment    Psychiatric/Psychological Treatment Diagnosis: Mental Health diagnosis of PTSD  She is not prescribed medication at this time         History of Eating Disorder: Denied    Outpatient Counselor: Currently in therapy with Dr Ebenezer Knox with [facility] psychology  Psychiatrist: No involvement currently  Have you had any Mental Health Higher level of care (ED, PHP or Inpatient Treatment? No      Tobacco/Vaping History: Current use of nicotine products  Informed of nicotine policy and needs nicotine test after 30 days  Drug and/or Alcohol use: has a drink or two two nights a week  Believes she will have minor difficulty no drinking and we will review at next sessions  Have you had any Substance Use Treatment? No      Domestic Violence: No         Abuse or Trauma History: No       Risk Assessment    Identified support system intact  Risk of harm to self or others: None noted during evaluation  No HI/SI    Presence of Audio/Visual Hallucinations: Not reported during evaluation  Access to weapons: Denied    Observation: This appointment only (SW and RD will follow Andre Ennis through the bariatric program)     Based on the previous information, the client presents the following risk of harm to self or others: LOW      Physical/Mental Health Status:          Appearance: appropriate           Sociability: average           Affect: appropriate           Mood: calm           Thought Process: coherent           Speech: normal           Content: no impairment           Orientation: person  Yes , place  Yes , time  Yes , normal attention span  Yes ,              normal memory      Yes   and normal judgement  Yes            Insight: emotional  good       Note :  Patient presented for behavioral health evaluation for the bariatric program  Mental Health diagnosis of PTSD and she is currently involved with  therapy  No involvement with  Psychiatry and never hospitalized and does not take medication  Symptoms are stable at this time  Patient will not be required to have psychiatric evaluation done as s/he has a well established relationship with provider and is compliant with treatment  No Drug and/or Alcohol abuse or treatment  Current nicotine use and is informed of our Nicotine plicy  Patient educated regarding the impact of nicotine and alcohol on the post surgery bariatric patient  Recommendation for surgery are deferred until patient has a negative nicoline test     Goal for next  appointment: Increase activity to walking pad under her desk 5 min at a time  She walks the dogs 45-60 min  when the weather is good  She will consider using the stationary bike or utube videos for bad weather days  Next appointment with 4/27 with me  [id number] [facility]    Patient has received the following education related to the bariatric surgery process and understands:    Patients may be required to complete a psychiatric evaluation and receive clearance for surgery from mental health provider  Patients who undergo weight loss surgery are at higher risk of increased mental health concerns and suicide attempts  Patients may be required to complete a full substance abuse evaluation and then complete all treatment recommendations prior to surgery  If diagnosis of abuse/dependence results, patient may be required to remain sober for one (1) year before having bariatric surgery  Patients on psychiatric medications should check with their provider to discuss psychiatric medications and the changes in absorption  Patient should discuss all time release medications with provider and take all medications as prescribed  The recommendation is that there is no use of any tobacco products, Hookah or vapes for the bariatric post-operation patient  Bariatric surgery patients should not consume alcohol as a post-operative patient as it may increase risk of numerous health conditions including but not limited to alcohol abuse and ulcers  There is a possibility of weight regain if patient does not follow all program guidelines and recommendations      Bariatric surgery patients should exercise thirty (30) to sixty (60) minutes per day to maintain post-surgical weight loss  Research indicates that bariatric patients are more successful when they see a therapist for up to two (2) years post-op  Patients will follow all medical and dietary recommendations provided  Patient will keep all scheduled appointments and follow up with their physician for a minimum of five (5) years  Patient will take all vitamins as recommended  Post-operative vitamins are life-long  There is a goal month set  All requirements should be met by this time  Don't wait to get started! There is a deadline month set  All requirements must be finished by this time and if not, the patient will be halted in the surgery process  The patient can be referred to the medical weight management program or can come back to the surgical program once the unfinished tasks from the previous program are completed  Female patients of childbearing years are informed that pregnancy is not recommended

## 2023-04-04 ENCOUNTER — TELEPHONE (OUTPATIENT)
Dept: CARDIOLOGY CLINIC | Facility: CLINIC | Age: 43
End: 2023-04-04

## 2023-04-27 ENCOUNTER — OFFICE VISIT (OUTPATIENT)
Dept: BARIATRICS | Facility: CLINIC | Age: 43
End: 2023-04-27

## 2023-04-27 DIAGNOSIS — E66.9 OBESITY, CLASS II, BMI 35-39.9: ICD-10-CM

## 2023-04-27 DIAGNOSIS — F17.200 SMOKER: Primary | ICD-10-CM

## 2023-04-27 DIAGNOSIS — E66.9 OBESITY, CLASS II, BMI 35-39.9: Primary | ICD-10-CM

## 2023-04-27 RX ORDER — LEVONORGESTREL AND ETHINYL ESTRADIOL 0.15-0.03
1 KIT ORAL DAILY
COMMUNITY
Start: 2022-05-10 | End: 2023-05-02 | Stop reason: ALTCHOICE

## 2023-04-27 NOTE — PROGRESS NOTES
Sondra Munson  is a 40 y o    female    :  1980  HOLD for nicotine test   Patient presented for Pre-surgical weight check  [address] [place] [address] number      1 of goal  Starting weight:214 3   Submission Target:   maintain Today's weight:      215 9    Discussed preparations for surgery and lifestyle change  Eating behaviors - focused on eating slower  Is in the consciously incompetent stage  Has been having a protein shake after dinner  Drinks plenty of water  Activity - walked a lot on the cruise they were on  That experience strengthened her resolve to have WLS, felt the weight  Continuing to walk at home  Discussed making it part of routine, allergies are difficult, has a treadmill at her desk  Likes having company  Sleep - Likes being in bed, not sleeping to cope  Wonders if the protein shake at night helps with sleep  Coping - mental health is stable with no horrible days  Progress toward program requirements  Workflow:  · Psych and/or D+A Clearance: n/a  · Surgeon Appt : will call after nicotine test  · EGD :   · Support Group: done  · PCP Letter:   · Cardiac Risk Assessment:   · Sleep Studies:   · Bloodwork:   · Nicotine test: needs  · Submission weight goal:    Plan: Eating slower  Next appointment is scheduled for       ANETTE Heck, LCSW  _____________________________

## 2023-04-28 ENCOUNTER — APPOINTMENT (OUTPATIENT)
Dept: LAB | Facility: CLINIC | Age: 43
End: 2023-04-28

## 2023-04-28 ENCOUNTER — PATIENT MESSAGE (OUTPATIENT)
Dept: CARDIOLOGY CLINIC | Facility: CLINIC | Age: 43
End: 2023-04-28

## 2023-04-28 DIAGNOSIS — E66.9 OBESITY, CLASS II, BMI 35-39.9: ICD-10-CM

## 2023-04-28 DIAGNOSIS — F17.200 SMOKER: ICD-10-CM

## 2023-05-02 ENCOUNTER — OFFICE VISIT (OUTPATIENT)
Dept: CARDIOLOGY CLINIC | Facility: CLINIC | Age: 43
End: 2023-05-02

## 2023-05-02 VITALS
DIASTOLIC BLOOD PRESSURE: 88 MMHG | BODY MASS INDEX: 36.65 KG/M2 | HEIGHT: 65 IN | TEMPERATURE: 97.5 F | SYSTOLIC BLOOD PRESSURE: 128 MMHG | WEIGHT: 220 LBS | HEART RATE: 78 BPM | OXYGEN SATURATION: 99 %

## 2023-05-02 DIAGNOSIS — Z01.810 PREOPERATIVE CARDIOVASCULAR EXAMINATION: Primary | ICD-10-CM

## 2023-05-02 DIAGNOSIS — E66.9 OBESITY, CLASS II, BMI 35-39.9: ICD-10-CM

## 2023-05-02 DIAGNOSIS — I10 BENIGN ESSENTIAL HTN: ICD-10-CM

## 2023-05-02 DIAGNOSIS — E78.2 MODERATE MIXED HYPERLIPIDEMIA NOT REQUIRING STATIN THERAPY: ICD-10-CM

## 2023-05-02 RX ORDER — OMEPRAZOLE 40 MG/1
40 CAPSULE, DELAYED RELEASE ORAL DAILY
COMMUNITY

## 2023-05-02 RX ORDER — FUROSEMIDE 20 MG/1
20 TABLET ORAL DAILY PRN
COMMUNITY

## 2023-05-02 NOTE — PATIENT INSTRUCTIONS
Your EKG shows sinus rhythm  I recommend an echocardiogram to assess heart structure and function but this is not needed before surgery  Discuss hydrochlorothiazide with your primary care as this may be a great addition to your lisinopril for optimal BP control and fluid control

## 2023-05-02 NOTE — PROGRESS NOTES
Cardiology Follow Up    Nathan Lizarraga  1980  [id number]  [facility] [place]  [address] [place] [address] [address]  2ND FLOOR  [place]  [facility]  [phone]    Ermelinda Azul presents for pre-operative cardiac evaluation in preparation for bariatric surgery  1  Preoperative cardiovascular examination  Assessment & Plan:  Patient presents for pre-operative cardiac evaluation in preparation for possible bariatric surgery  ECG today shows NSR with possible LVH  She is able to perform > 4 METs  RCRI score of 1 with 6% 30-day risk of death, MI, cardiac arrest   She is without anginal symptoms or evidence of heart failure  She is acceptable cardiac risk to proceed  I do recommend a baseline echocardiogram given her history of hypertension, however this does not need to occur prior to surgery  Recommend pre- and postoperative ECG  2  Benign essential HTN  Assessment & Plan:  History of hypertension which has been controlled with lisinopril 10 mg daily  BP is acceptable today  We discussed that she may benefit from daily HCTZ 12 5 mg for assistance with intermittent edema and blood pressure control  ECG today shows NSR with borderline criteria for LVH  Will proceed with echocardiogram to evaluate heart structure and function  Continue to follow up with PCP for management  Orders:  -     Echo complete w/ contrast if indicated; Future; Expected date: 05/02/2023  -     POCT ECG    3  Moderate mixed hyperlipidemia not requiring statin therapy  Assessment & Plan:  Lipid panel 12/29/2022: C 213  T 161  H 38  L 143   10 year ASCVD score of 4 7  She defers statin therapy at this time as she is pursing weight loss options  I recommend routine monitoring by PCP  4  Obesity, Class II, BMI 35-39 9  Assessment & Plan: Body mass index is 37 18 kg/m²  Currently undergoing work up by bariatrics for possible weight loss surgery           HPI  Ermelinda Azul has a past medical history of hypertension, hyperlipidemia, homozygous MTHFR mutation, obesity, prior tobacco use, and GERD     2023: Henry De La Fuente presents today at the request of bariatrics for pre-operative cardiac evaluation in preparation for possible bariatric surgery  She is hopeful to proceed with surgery later this summer or early   She has a history of high blood pressure first treated in her late 29's  She is maintained on lisinopril 10 mg daily  She has a script for furosemide 20 mg for PRN use for leg edema which she states she has only taken once  She denies chest pain, shortness of breath or dyspnea on exertion, palpitations/heart racing, or lightheadedness  She denies any exertional symptoms  She is currently scheduled to undergo evaluation for sleep apnea  ECG today shows normal sinus rhythm with borderline criteria for LVH  It does not appear that she has undergone cardiac testing in the past  She quit smoking in March  Medical Problems     Problem List     Obesity, Class II, BMI 35-39 9    Benign essential HTN    Moderate mixed hyperlipidemia not requiring statin therapy    Homozygous MTHFR mutation [id number]    GERD (gastroesophageal reflux disease)    At risk for sleep apnea        Past Medical History:   Diagnosis Date   • GERD (gastroesophageal reflux disease)    • Glaucoma    • Hyperlipidemia    • Hypertension    • PCOS (polycystic ovarian syndrome)    • Seasonal allergies      Social History     Socioeconomic History   • Marital status: /Civil Union     Spouse name: Not on file   • Number of children: Not on file   • Years of education: Not on file   • Highest education level: Not on file   Occupational History   • Not on file   Tobacco Use   • Smoking status: Former     Types: Cigarettes     Quit date: 3/16/2023     Years since quittin 1   • Smokeless tobacco: Never   Vaping Use   • Vaping Use: Former   Substance and Sexual Activity   • Alcohol use:  Yes     Alcohol/week: 4 0 standard drinks of alcohol Types: 4 Cans of beer per week     Comment: SOCIAL   • Drug use: Never   • Sexual activity: Not on file     Comment: defer   Other Topics Concern   • Not on file   Social History Narrative   • Not on file     Social Determinants of Health     Financial Resource Strain: Not on file   Food Insecurity: Not on file   Transportation Needs: Not on file   Physical Activity: Not on file   Stress: Not on file   Social Connections: Not on file   Intimate Partner Violence: Not on file   Housing Stability: Not on file      Family History   Problem Relation Age of Onset   • Hypertension Mother    • Hypertension Father    • Diabetes Father    • Liver disease Father    • Stroke Father 32   • Hypertension Sister    • Breast cancer Maternal Grandmother 61   • No Known Problems Maternal Grandfather         history unknown   • Heart disease Paternal Grandmother 76   • Colon cancer Paternal Grandmother    • Lung cancer Paternal Grandfather    • Cirrhosis Paternal Grandfather    • Thyroid disease Neg Hx      Past Surgical History:   Procedure Laterality Date   •  SECTION     • DILATION AND CURETTAGE OF UTERUS      x11   • WISDOM TOOTH EXTRACTION         Current Outpatient Medications:   •  desoximetasone (TOPICORT) 0 25 % ointment, , Disp: , Rfl:   •  fexofenadine (ALLEGRA) 180 MG tablet, , Disp: , Rfl:   •  fluticasone (FLONASE) 50 mcg/act nasal spray, 2 sprays, Disp: , Rfl:   •  furosemide (LASIX) 20 mg tablet, Take 20 mg by mouth daily as needed As needed for swelling, Disp: , Rfl:   •  Jolessa 0 15-0 03 MG per tablet, , Disp: , Rfl:   •  latanoprost (XALATAN) 0 005 % ophthalmic solution, , Disp: , Rfl:   •  lisinopril (ZESTRIL) 10 mg tablet, , Disp: , Rfl:   •  omeprazole (PriLOSEC) 40 MG capsule, Take 40 mg by mouth daily, Disp: , Rfl:   •  valACYclovir (VALTREX) 1,000 mg tablet, Take 1 tablet (1,000 mg total) by mouth 2 (two) times a day for 7 days, Disp: 14 tablet, Rfl: 1  Allergies   Allergen Reactions   • Doxycycline "GI Intolerance     ECG 5/2/2023: Normal sinus rhythm  Borderline criteria for LVH  Rate 70 bpm     Lipid panel 12/29/2022: C 213  T 161  H 38  L 143  Review of Systems   Constitutional: Negative  HENT: Negative  Cardiovascular: Positive for leg swelling (intermittent)  Negative for chest pain, dyspnea on exertion, irregular heartbeat, near-syncope, orthopnea and palpitations  Respiratory: Negative for cough and snoring  Endocrine: Negative  Skin: Negative  Musculoskeletal: Negative  Gastrointestinal: Negative  Genitourinary: Negative  Neurological: Negative  Psychiatric/Behavioral: Negative  Vitals:    05/02/23 1549   BP: 128/88   Pulse: 78   Temp: 97 5 °F (36 4 °C)   SpO2: 99%     Vitals:    05/02/23 1549   Weight: 99 8 kg (220 lb)     Height: 5' 4 5\" (163 8 cm)   Body mass index is 37 18 kg/m²  Physical Exam  Vitals and nursing note reviewed  Constitutional:       General: She is not in acute distress  Appearance: She is well-developed  She is obese  She is not diaphoretic  HENT:      Head: Normocephalic and atraumatic  Neck:      Thyroid: No thyromegaly  Vascular: No carotid bruit or JVD  Cardiovascular:      Rate and Rhythm: Normal rate and regular rhythm  No extrasystoles are present  Pulses: Intact distal pulses  Radial pulses are 2+ on the right side and 2+ on the left side  Heart sounds: Normal heart sounds, S1 normal and S2 normal  No murmur heard  Comments: Trace ankle edema  Pulmonary:      Effort: Pulmonary effort is normal       Breath sounds: Normal breath sounds  Abdominal:      General: There is no distension  Palpations: Abdomen is soft  Tenderness: There is no abdominal tenderness  Musculoskeletal:         General: Normal range of motion  Cervical back: Normal range of motion and neck supple  Lymphadenopathy:      Cervical: No cervical adenopathy  Skin:     General: Skin is warm and dry   " Neurological:      Mental Status: She is alert and oriented to person, place, and time  Cranial Nerves: No cranial nerve deficit  Psychiatric:         Mood and Affect: Mood and affect normal          Speech: Speech normal          Behavior: Behavior normal  Behavior is cooperative           Cognition and Memory: Cognition and memory normal

## 2023-05-03 LAB
COTININE SERPL-MCNC: <1 NG/ML
NICOTINE SERPL-MCNC: <1 NG/ML

## 2023-05-16 ENCOUNTER — OFFICE VISIT (OUTPATIENT)
Dept: SLEEP CENTER | Facility: CLINIC | Age: 43
End: 2023-05-16

## 2023-05-16 VITALS
BODY MASS INDEX: 36.25 KG/M2 | HEIGHT: 65 IN | WEIGHT: 217.6 LBS | DIASTOLIC BLOOD PRESSURE: 86 MMHG | HEART RATE: 75 BPM | SYSTOLIC BLOOD PRESSURE: 154 MMHG

## 2023-05-16 DIAGNOSIS — E66.9 OBESITY, CLASS II, BMI 35-39.9: ICD-10-CM

## 2023-05-16 DIAGNOSIS — Z91.89 AT RISK FOR SLEEP APNEA: Primary | ICD-10-CM

## 2023-05-16 DIAGNOSIS — I10 BENIGN ESSENTIAL HTN: ICD-10-CM

## 2023-05-16 NOTE — ASSESSMENT & PLAN NOTE
We discussed that obesity can be associated with obstructive sleep apnea  If she is found to have sleep apnea, weight loss could potentially improve or even resolve obstructive sleep apnea

## 2023-05-16 NOTE — ASSESSMENT & PLAN NOTE
Patient has risk factors for obstructive sleep apnea due to BMI of 36, snoring, and recently diagnosed hypertension  A home sleep study was ordered today  If she is found to have sleep apnea, CPAP treatment will be started as soon as possible  We discussed that treating sleep apnea would be extremely important prior to having bariatric surgery as it can reduce her perioperative event risk  Patient was agreeable to having the sleep study and starting CPAP if needed

## 2023-05-16 NOTE — ASSESSMENT & PLAN NOTE
We discussed that untreated undiagnosed obstructive sleep apnea can make hypertension more difficult to control  Sleep study was ordered and CPAP treatment will be initiated if necessary

## 2023-05-16 NOTE — PATIENT INSTRUCTIONS
A home sleep study has been ordered today  Please schedule that as soon as possible  Your results should be completed 1 to 2 weeks after you complete the study  A nurse will call you and review the results with you  If your test is normal, we will just see you as needed  If your test show sleep apnea, we would order CPAP and have you start that as soon as possible  We would then need to follow-up with you 30 days after you begin treatment  Nursing Support:  When: Monday through Friday 7A-5PM except holidays  Where: Our direct line is [phone]  If you are having a true emergency please call 911  In the event that the line is busy or it is after hours please leave a voice message and we will return your call  Please speak clearly, leaving your full name, birth date, best number to reach you and the reason for your call  Medication refills: We will need the name of the medication, the dosage, the ordering provider, whether you get a 30 or 90 day refill, and the pharmacy name and address  Medications will be ordered by the provider only  Nurses cannot call in prescriptions  Please allow 7 days for medication refills  Physician requested updates: If your provider requested that you call with an update after starting medication, please be ready to provide us the medication and dosage, what time you take your medication, the time you attempt to fall asleep, time you fall asleep, when you wake up, and what time you get out of bed  Sleep Study Results: We will contact you with sleep study results and/or next steps after the physician has reviewed your testing

## 2023-05-16 NOTE — PROGRESS NOTES
Consultation - [address], 1980, MRN: [id number]    5/16/2023        Reason for Consult / Principal Problem:    Suspected Obstructive Sleep Apnea  Obesity       Thank you for the opportunity of participating in the evaluation and care of this patient in the Sleep Clinic at [facility]  Subjective:     HPI: Bhavin Fleming is a 40y o  year old female who presents today upon referral from her bariatric doctor to be evaluated for obstructive sleep apnea  She is planning for gastric sleeve surgery in August or September of this year  She states she is known to snore but does not have any other specific complaints about her sleep  Comorbid conditions:  Hypertension, GERD    Recent Labs: 12/29/2022, CO2 of 23, 5/18/2022 TSH normal, CBC normal    Sleep Study Results:  No prior studies     CPAP Equipment:  No prior use    Employment:  Works full time in [employer] and [employer], daylight shift, no CDL, no drowsy driving    Sleep Schedule:       Bedtime:  8-9 PM, workdays, 9-10 PM      Latency:  10 minutes       Wakeup time:  6-8 AM     Awakenings:       Frequency:  Once       Causes:  Urination       Duration:  Few minutes     Daytime Sleepiness / Inappropriate Sleep:       Most severe:  None       Naps :  None       Inappropriate drowsiness / sleep:  No dozing     Snoring: snores per      Apnea:  None witnessed     Change in Weight:  Stable     Restless Leg Syndrome:  No clinical symptoms consistent with this diagnosis     Other Complaints:  No reports of sleep walking, sleep talking, sleep paralysis or hallucinations surrounding sleep  Denies waking up with headaches, bruxism, and + dry mouth  Social History:      Caffeine:  1 soda couple times per week      Tobacco:   reports that she quit smoking about 2 months ago  Her smoking use included cigarettes   She has never used smokeless "tobacco      E-cig/Vaping:    E-Cigarette/Vaping   • E-Cigarette Use Former User       E-Cigarette/Vaping Substances         Alcohol:   reports current alcohol use of about 4 0 standard drinks per week  Drugs:   reports no history of drug use  The review of systems and following portions of the patient's history were reviewed and updated as appropriate: allergies, current medications, past family history, past medical history, past social history, past surgical history and problem list         Objective:       Vitals:    05/16/23 0854   BP: 154/86   BP Location: Left arm   Patient Position: Sitting   Cuff Size: Adult   Pulse: 75   Weight: 98 7 kg (217 lb 9 6 oz)   Height: 5' 4 5\" (1 638 m)     Body mass index is 36 77 kg/m²  Neck Circumference: 15 5  Clare Sleepiness Scale:  Total score: 3      Current Outpatient Medications:   •  desoximetasone (TOPICORT) 0 25 % ointment, , Disp: , Rfl:   •  fexofenadine (ALLEGRA) 180 MG tablet, , Disp: , Rfl:   •  fluticasone (FLONASE) 50 mcg/act nasal spray, 2 sprays, Disp: , Rfl:   •  furosemide (LASIX) 20 mg tablet, Take 20 mg by mouth daily as needed As needed for swelling, Disp: , Rfl:   •  Jolessa 0 15-0 03 MG per tablet, , Disp: , Rfl:   •  latanoprost (XALATAN) 0 005 % ophthalmic solution, , Disp: , Rfl:   •  lisinopril (ZESTRIL) 10 mg tablet, , Disp: , Rfl:   •  omeprazole (PriLOSEC) 40 MG capsule, Take 40 mg by mouth daily, Disp: , Rfl:   •  valACYclovir (VALTREX) 1,000 mg tablet, Take 1 tablet (1,000 mg total) by mouth 2 (two) times a day for 7 days, Disp: 14 tablet, Rfl: 1    Physical Exam  General Appearance:   Alert, cooperative, no distress, appears stated age, obese     Head:   Normocephalic, without obvious abnormality, atraumatic     Eyes:   PERRL, conjunctiva/corneas clear          Nose:  Nares normal, septum midline, mucosa normal, no drainage or sinus tenderness           Throat:  Lips, teeth and gums normal; tongue thick in size with scalloping " noted and midline in position; mucosa moist , uvula normal, tonsils not visualized, Mallampati class 3     Neck:  Supple, symmetrical, trachea midline, no adenopathy; no thyromegaly noted, no carotid bruit or JVD     Lungs:      Clear to auscultation bilaterally, respirations unlabored     Heart:   Regular rate and rhythm, S1 and S2 normal, no murmur, rub or gallop       Extremities:  Extremities normal, atraumatic, no cyanosis or edema       Skin:  Skin color, texture, turgor normal, no rashes or lesions       Neurologic:  No focal deficits noted  ASSESSMENT / PLAN     1  At risk for sleep apnea  Assessment & Plan:  Patient has risk factors for obstructive sleep apnea due to BMI of 36, snoring, and recently diagnosed hypertension  A home sleep study was ordered today  If she is found to have sleep apnea, CPAP treatment will be started as soon as possible  We discussed that treating sleep apnea would be extremely important prior to having bariatric surgery as it can reduce her perioperative event risk  Patient was agreeable to having the sleep study and starting CPAP if needed  Orders:  -     Ambulatory referral to Sleep Medicine  -     Home Study; Future    2  Benign essential HTN  Assessment & Plan:  We discussed that untreated undiagnosed obstructive sleep apnea can make hypertension more difficult to control  Sleep study was ordered and CPAP treatment will be initiated if necessary  Orders:  -     Home Study; Future    3  Obesity, Class II, BMI 35-39 9  Assessment & Plan:  We discussed that obesity can be associated with obstructive sleep apnea  If she is found to have sleep apnea, weight loss could potentially improve or even resolve obstructive sleep apnea             Counseling / Coordination of Care    I have spent a total time of 45 minutes on 05/16/23 in caring for this patient including Risks and benefits of tx options, Patient and family education, Risk factor reductions, Impressions, Counseling / Coordination of care, Documenting in the medical record, Reviewing / ordering tests, medicine, procedures   and Obtaining or reviewing history    The following instructions have been given to the patient today:    Patient Instructions   A home sleep study has been ordered today  Please schedule that as soon as possible  Your results should be completed 1 to 2 weeks after you complete the study  A nurse will call you and review the results with you  If your test is normal, we will just see you as needed  If your test show sleep apnea, we would order CPAP and have you start that as soon as possible  We would then need to follow-up with you 30 days after you begin treatment  Nursing Support:  When: Monday through Friday 7A-5PM except holidays  Where: Our direct line is [phone]  If you are having a true emergency please call 911  In the event that the line is busy or it is after hours please leave a voice message and we will return your call  Please speak clearly, leaving your full name, birth date, best number to reach you and the reason for your call  Medication refills: We will need the name of the medication, the dosage, the ordering provider, whether you get a 30 or 90 day refill, and the pharmacy name and address  Medications will be ordered by the provider only  Nurses cannot call in prescriptions  Please allow 7 days for medication refills  Physician requested updates: If your provider requested that you call with an update after starting medication, please be ready to provide us the medication and dosage, what time you take your medication, the time you attempt to fall asleep, time you fall asleep, when you wake up, and what time you get out of bed  Sleep Study Results: We will contact you with sleep study results and/or next steps after the physician has reviewed your testing               Arturo Ying PA-C  [facility]

## 2023-05-17 ENCOUNTER — HOSPITAL ENCOUNTER (OUTPATIENT)
Dept: SLEEP CENTER | Facility: CLINIC | Age: 43
Discharge: HOME | End: 2023-05-17

## 2023-05-17 DIAGNOSIS — I10 BENIGN ESSENTIAL HTN: ICD-10-CM

## 2023-05-17 DIAGNOSIS — Z91.89 AT RISK FOR SLEEP APNEA: ICD-10-CM

## 2023-05-18 ENCOUNTER — CONSULT (OUTPATIENT)
Dept: BARIATRICS | Facility: CLINIC | Age: 43
End: 2023-05-18

## 2023-05-18 VITALS
HEART RATE: 88 BPM | TEMPERATURE: 98.2 F | SYSTOLIC BLOOD PRESSURE: 132 MMHG | DIASTOLIC BLOOD PRESSURE: 84 MMHG | WEIGHT: 218 LBS | BODY MASS INDEX: 36.32 KG/M2 | HEIGHT: 65 IN

## 2023-05-18 DIAGNOSIS — E66.01 MORBID (SEVERE) OBESITY DUE TO EXCESS CALORIES (HCC): Primary | ICD-10-CM

## 2023-05-18 NOTE — PROGRESS NOTES
"    BARIATRIC CONSULT-INITIAL - BARIATRIC SURGERY  Norma Roper 40 y o  female MRN: [id number]  Unit/Bed#:  Encounter: [id number]      HPI:  Norma Roper is a 40 y o  female who presents with morbid obesity to discuss weight loss options  Review of Systems    Historical Information   Past Medical History:   Diagnosis Date   • GERD (gastroesophageal reflux disease)    • Glaucoma    • Hyperlipidemia    • Hypertension    • PCOS (polycystic ovarian syndrome)    • Seasonal allergies      Past Surgical History:   Procedure Laterality Date   •  SECTION     • DILATION AND CURETTAGE OF UTERUS      x11   • WISDOM TOOTH EXTRACTION       Social History   Social History     Substance and Sexual Activity   Alcohol Use Yes   • Alcohol/week: 4 0 standard drinks   • Types: 4 Cans of beer per week    Comment: SOCIAL     Social History     Substance and Sexual Activity   Drug Use Never     Social History     Tobacco Use   Smoking Status Former   • Types: Cigarettes   • Quit date: 3/16/2023   • Years since quittin 1   Smokeless Tobacco Never     Family History: non-contributory    Meds/Allergies   all medications and allergies reviewed  Allergies   Allergen Reactions   • Doxycycline GI Intolerance       Objective       Current Vitals:   Blood Pressure: 132/84 (23 0940)  Pulse: 88 (23 0940)  Temperature: 98 2 °F (36 8 °C) (23 0940)  Temp Source: Tympanic (23 0940)  Height: 5' 4 5\" (163 8 cm) (23 0940)  Weight - Scale: 98 9 kg (218 lb) (23 0940)  Body mass index is 36 84 kg/m²  Invasive Devices     None                 Physical Exam    Lab Results: I have personally reviewed pertinent lab results  Imaging: I have personally reviewed pertinent reports  EKG, Pathology, and Other Studies: I have personally reviewed pertinent reports        Code Status: [unfilled]  Advance Directive and Living Will:      Power of :    POLST:  " Assessment/PLAN:            Patient has a long history of morbid obesity and is presenting to discuss the surgical weight loss options  Despite the patient best efforts patient was unable to lose any meaningful or sustainable weight using nonsurgical means  We had a long discussion regarding all the surgical weight-loss options at our disposal at this point and reviewed the risks and benefits of each procedure in details as it relates to her age, BMI and medical conditions  Patient elected to undergo Sleeve     Risks and benefits were explained to the patient  We also discussed the importance and need of a preoperative workup to make sure that the patient can undergo the procedure safely  Preoperative workup includes sleep apnea screening, cardiac evaluation, nutrition/psych and preoperative EGD  Risks and benefits of all the preoperative diagnostic tests were discussed with the patient including but not limited to the upper endoscopy  Alternatives to surgery and alternative forms of surgery were also explained  Postsurgical commitment and aftercare programs were discussed and explained to the patient in details   In terms of comorbidities patient suffers mostly of   Past Medical History:   Diagnosis Date   • GERD (gastroesophageal reflux disease)    • Glaucoma    • Hyperlipidemia    • Hypertension    • PCOS (polycystic ovarian syndrome)    • Seasonal allergies        I informed the patient that the rate of resolution of comorbid conditions following weight loss surgery is between 60 and 90% depending on the severity of the specific medical condition  I discussed and educated the patient regarding the different components of our multidisciplinary program and the importance of compliance and follow-up in the postoperative period  All questions answered  Patient understands risks and benefits  An image of the procedure was also shown to the patient   After showing the image we discussed all the technical aspects of the procedure and also the potential complications including but not limited to gastrointestinal perforation, leak, obstruction, stricture and hemorrhage  I spent 30 min with the patient more than 50% of the time was spent educating the patient and coordinating care

## 2023-05-18 NOTE — PROGRESS NOTES
Home Sleep Study Documentation    HOME STUDY DEVICE: Noxturnal no                                           Charlene G3 yes      Pre-Sleep Home Study:    Set-up and instructions performed by: MA-Tech    Technician performed demonstration for Patient: yes    Return demonstration performed by Patient: yes    Written instructions provided to Patient: yes    Patient signed consent form: yes        Post-Sleep Home Study:    Additional comments by Patient: None    Home Sleep Study Failed:no:    Failure reason: N/A    Reported or Detected: N/A    Scored by: E April Prader

## 2023-05-25 DIAGNOSIS — G47.33 OSA (OBSTRUCTIVE SLEEP APNEA): Primary | ICD-10-CM

## 2023-05-25 PROBLEM — Z01.810 PREOPERATIVE CARDIOVASCULAR EXAMINATION: Status: ACTIVE | Noted: 2023-05-25

## 2023-05-25 NOTE — ASSESSMENT & PLAN NOTE
History of hypertension which has been controlled with lisinopril 10 mg daily  BP is acceptable today  We discussed that she may benefit from daily HCTZ 12 5 mg for assistance with intermittent edema and blood pressure control  ECG today shows NSR with borderline criteria for LVH  Will proceed with echocardiogram to evaluate heart structure and function  Continue to follow up with PCP for management

## 2023-05-25 NOTE — ASSESSMENT & PLAN NOTE
Body mass index is 37 18 kg/m²  Currently undergoing work up by bariatrics for possible weight loss surgery

## 2023-05-25 NOTE — ASSESSMENT & PLAN NOTE
Patient presents for pre-operative cardiac evaluation in preparation for possible bariatric surgery  ECG today shows NSR with possible LVH  She is able to perform > 4 METs  RCRI score of 1 with 6% 30-day risk of death, MI, cardiac arrest   She is without anginal symptoms or evidence of heart failure  She is acceptable cardiac risk to proceed  I do recommend a baseline echocardiogram given her history of hypertension, however this does not need to occur prior to surgery  Recommend pre- and postoperative ECG

## 2023-05-25 NOTE — ASSESSMENT & PLAN NOTE
Lipid panel 12/29/2022: C 213  T 161  H 38  L 143   10 year ASCVD score of 4 7  She defers statin therapy at this time as she is pursing weight loss options  I recommend routine monitoring by PCP

## 2023-05-31 ENCOUNTER — HOSPITAL ENCOUNTER (OUTPATIENT)
Dept: NON INVASIVE DIAGNOSTICS | Facility: HOSPITAL | Age: 43
Discharge: HOME | End: 2023-05-31

## 2023-05-31 VITALS
BODY MASS INDEX: 36.33 KG/M2 | SYSTOLIC BLOOD PRESSURE: 136 MMHG | WEIGHT: 218.03 LBS | HEART RATE: 77 BPM | HEIGHT: 65 IN | DIASTOLIC BLOOD PRESSURE: 84 MMHG

## 2023-05-31 DIAGNOSIS — I10 BENIGN ESSENTIAL HTN: ICD-10-CM

## 2023-06-01 ENCOUNTER — TELEPHONE (OUTPATIENT)
Dept: SLEEP CENTER | Facility: CLINIC | Age: 43
End: 2023-06-01

## 2023-06-01 LAB
AORTIC ROOT: 3.2 CM
ASCENDING AORTA: 3.8 CM
DME PARACHUTE DELIVERY DATE REQUESTED: NORMAL
DME PARACHUTE DELIVERY NOTE: NORMAL
DME PARACHUTE ITEM DESCRIPTION: NORMAL
DME PARACHUTE ORDER STATUS: NORMAL
DME PARACHUTE SUPPLIER NAME: NORMAL
DME PARACHUTE SUPPLIER PHONE: NORMAL
E WAVE DECELERATION TIME: 153 MS
FRACTIONAL SHORTENING: 36 % (ref 28–44)
INTERVENTRICULAR SEPTUM IN DIASTOLE (PARASTERNAL SHORT AXIS VIEW): 1 CM
INTERVENTRICULAR SEPTUM: 1 CM (ref 0.6–1.1)
LAAS-AP2: 17.7 CM2
LAAS-AP4: 15.9 CM2
LEFT ATRIUM SIZE: 3.9 CM
LEFT INTERNAL DIMENSION IN SYSTOLE: 2.8 CM (ref 2.1–4)
LEFT VENTRICULAR INTERNAL DIMENSION IN DIASTOLE: 4.4 CM (ref 3.5–6)
LEFT VENTRICULAR POSTERIOR WALL IN END DIASTOLE: 1 CM
LEFT VENTRICULAR STROKE VOLUME: 58 ML
LVSV (TEICH): 58 ML
MV E'TISSUE VEL-LAT: 10 CM/S
MV E'TISSUE VEL-SEP: 6 CM/S
MV PEAK A VEL: 0.61 M/S
MV PEAK E VEL: 65 CM/S
MV STENOSIS PRESSURE HALF TIME: 44 MS
MV VALVE AREA P 1/2 METHOD: 5 CM2
RIGHT ATRIUM AREA SYSTOLE A4C: 11.7 CM2
RIGHT VENTRICLE ID DIMENSION: 2.5 CM
SL CV LEFT ATRIUM LENGTH A2C: 4.3 CM
SL CV PED ECHO LEFT VENTRICLE DIASTOLIC VOLUME (MOD BIPLANE) 2D: 87 ML
SL CV PED ECHO LEFT VENTRICLE SYSTOLIC VOLUME (MOD BIPLANE) 2D: 29 ML
TR MAX PG: 3 MMHG
TR PEAK VELOCITY: 0.8 M/S
TRICUSPID ANNULAR PLANE SYSTOLIC EXCURSION: 2.4 CM
TRICUSPID VALVE PEAK E WAVE VELOCITY: 0.15 M/S
TRICUSPID VALVE PEAK REGURGITATION VELOCITY: 0.83 M/S

## 2023-06-01 NOTE — TELEPHONE ENCOUNTER
Spoke to the patient advised sleep study results and scheduled the patient for DME set up and compliance appointment   Placed on wait list  Patient needs compliance between 7/17/2023 and 9/15/2023    RX and clinicals sent to [facility]

## 2023-06-01 NOTE — TELEPHONE ENCOUNTER
----- Message from Shaji Peterson PA-C sent at 5/25/2023  8:59 AM EDT -----  Please advised the patient her home sleep study showed mild obstructive sleep apnea with an KVNG of 10  I would recommend starting CPAP therapy as she also has a diagnosis of hypertension  Patient is planning for bariatric surgery in August or September of this year  it is important for her to treat her sleep apnea to reduce her perioperative event risk  An order was placed for auto CPAP  Please have the patient's start as soon as possible and follow-up 30 to 90 days after beginning treatment

## 2023-06-05 LAB
DME PARACHUTE DELIVERY DATE EXPECTED: NORMAL
DME PARACHUTE DELIVERY DATE REQUESTED: NORMAL
DME PARACHUTE DELIVERY NOTE: NORMAL
DME PARACHUTE ITEM DESCRIPTION: NORMAL
DME PARACHUTE ORDER STATUS: NORMAL
DME PARACHUTE SUPPLIER NAME: NORMAL
DME PARACHUTE SUPPLIER PHONE: NORMAL

## 2023-06-06 ENCOUNTER — OFFICE VISIT (OUTPATIENT)
Dept: BARIATRICS | Facility: CLINIC | Age: 43
End: 2023-06-06

## 2023-06-06 VITALS — HEIGHT: 65 IN | WEIGHT: 217 LBS | BODY MASS INDEX: 36.15 KG/M2

## 2023-06-06 DIAGNOSIS — E66.9 OBESITY, CLASS II, BMI 35-39.9: Primary | ICD-10-CM

## 2023-06-06 PROCEDURE — RECHECK

## 2023-06-06 NOTE — PROGRESS NOTES
"Bariatric Nutrition Follow Up Note    Type of surgery    Preop  Surgery Date: TBD  Surgeon: Dr Hermelinda Carrillo  44 y o   female     Wt with BMI of 25: 148#  Pre-Op Excess Wt: 66#  Height 5' 4 5\" (1 638 m), weight 98 4 kg (217 lb)  Body mass index is 36 67 kg/m²  Pender St Jeor Equation-2150 kcals to maintain wt                        Estimated protein needs 67-80g                        Estimated fluid needs >80 oz    Weight History   Onset of Obesity: Adult  Family history of obesity: Yes  Wt Loss Attempts: FAD Diets (Cabbage soup, Grapefruit, Cleanse, etc )  Meal Replacements (Medifast, Slim Fast, etc )  Self Created Diets (Portion Control, Healthy Food Choices, etc )  Maximum Wt Lost: 20# on Be Balanced (like Keto)    Review of History and Medications   Past Medical History:   Diagnosis Date   • GERD (gastroesophageal reflux disease)    • Glaucoma    • Hyperlipidemia    • Hypertension    • PCOS (polycystic ovarian syndrome)    • Seasonal allergies      Past Surgical History:   Procedure Laterality Date   •  SECTION     • DILATION AND CURETTAGE OF UTERUS      x11   • WISDOM TOOTH EXTRACTION       Social History     Socioeconomic History   • Marital status: /Civil Union     Spouse name: Not on file   • Number of children: Not on file   • Years of education: Not on file   • Highest education level: Not on file   Occupational History   • Not on file   Tobacco Use   • Smoking status: Former     Types: Cigarettes     Quit date: 3/16/2023     Years since quittin 2   • Smokeless tobacco: Never   Vaping Use   • Vaping Use: Former   Substance and Sexual Activity   • Alcohol use:  Yes     Alcohol/week: 4 0 standard drinks of alcohol     Types: 4 Cans of beer per week     Comment: SOCIAL   • Drug use: Never   • Sexual activity: Not on file     Comment: defer   Other Topics Concern   • Not on file   Social History Narrative   • Not on file     Social " Determinants of Health     Financial Resource Strain: Not on file   Food Insecurity: Not on file   Transportation Needs: Not on file   Physical Activity: Not on file   Stress: Not on file   Social Connections: Not on file   Intimate Partner Violence: Not on file   Housing Stability: Not on file       Current Outpatient Medications:   •  desoximetasone (TOPICORT) 0 25 % ointment, , Disp: , Rfl:   •  fexofenadine (ALLEGRA) 180 MG tablet, , Disp: , Rfl:   •  fluticasone (FLONASE) 50 mcg/act nasal spray, 2 sprays, Disp: , Rfl:   •  furosemide (LASIX) 20 mg tablet, Take 20 mg by mouth daily as needed As needed for swelling, Disp: , Rfl:   •  Jolessa 0 15-0 03 MG per tablet, , Disp: , Rfl:   •  latanoprost (XALATAN) 0 005 % ophthalmic solution, , Disp: , Rfl:   •  lisinopril (ZESTRIL) 10 mg tablet, , Disp: , Rfl:   •  omeprazole (PriLOSEC) 40 MG capsule, Take 40 mg by mouth daily, Disp: , Rfl:   •  valACYclovir (VALTREX) 1,000 mg tablet, Take 1 tablet (1,000 mg total) by mouth 2 (two) times a day for 7 days, Disp: 14 tablet, Rfl: 1  Food Intake and Lifestyle Assessment   Food Intake Assessment completed via usual diet recall  Breakfast:  Protein shakes (Fairlife)  Snack: 0   Lunch: leftovers or rotisserie chicken on wrap, or chicken salad on crackers  Snack:  [place] yogurt (15g)  Dinner: protein and vegetable, occ starch  Snack: 0  Beverage intake: water  Protein supplement: sometimes a Fairlife  Estimated protein intake per day: 80g on day of shake  Estimated fluid intake per day: 90 oz  Meals eaten away from home: at least 3 times/week  Typical meal pattern: 3 meals per day and 0-1 snacks per day  Eating Behaviors: Cutting down on portions, drinking shakes  Food allergies or intolerances:    Allergies   Allergen Reactions   • Doxycycline GI Intolerance     Cultural or Scientologist considerations: N/A    Physical Assessment  Physical Activity  Types of exercise: None  Current physical limitations: none    Psychosocial "Assessment   Support systems: spouse sister in law(had WLS 1 year ago)  Socioeconomic factors: Lives with  and son, works from home    Nutrition Diagnosis  Diagnosis: Overweight / Obesity (NC-3 3)  Related to: Physical inactivity and Excessive energy intake  As Evidenced by: BMI >25     Nutrition Prescription: Recommend the following diet  Low fat, Low sugar and High protein    Interventions and Teaching   Discussed pre-op and post-op nutrition guidelines  Patient educated and handouts provided  Surgical changes to stomach / GI  Capacity of post-surgery stomach  Diet progression  Adequate hydration  Sugar and fat restriction to decrease \"dumping syndrome\"  Fat restriction to decrease steatorrhea  Expected weight loss  Weight loss plateaus/ possibility of weight regain  Exercise  Suggestions for pre-op diet  Nutrition considerations after surgery  Protein supplements  Meal planning and preparation  Appropriate carbohydrate, protein, and fat intake, and food/fluid choices to maximize safe weight loss, nutrient intake, and tolerance   Dietary and lifestyle changes  Possible problems with poor eating habits  Intuitive eating  Techniques for self monitoring and keeping daily food journal  Potential for food intolerance after surgery, and ways to deal with them including: lactose intolerance, nausea, reflux, vomiting, diarrhea, food intolerance, appetite changes, gas  Vitamin / Mineral supplementation of Multivitamin with minerals and Vitamin D  Post-operative pregnancy guidelines    Education provided to: patient    Barriers to learning: No barriers identified  Readiness to change: preparation    Prior research on procedure: discussed with provider    Comprehension: verbalizes understanding     Expected Compliance: good  Recommendations  Pt is an appropriate candidate for surgery   Yes  Evaluation / Monitoring  Dietitian to Monitor: Eating pattern as discussed Body weight Lab values Physical " activity  Workflow:   • Psych and/or D+A Clearance: N/A  • Blood Work: done  • PCP letter: done  • Support Group: done  • Surgeon Appt: done  • EGD: done  • Cardiac Risk Assessment: done  • Sleep Studies: picking up CPAP 6/16/23  • Nicotine test: needs repeat  • 6 Month Pre-Operative Program: no wt checks required  • Weight Loss: stable wt      Goals  Food journal, Complete lession plans 1-6, Eat 3 meals per day and exercise 20 minutes 3 times/week    Time Spent:   30 minutes

## 2023-06-12 ENCOUNTER — TELEPHONE (OUTPATIENT)
Dept: CARDIOLOGY CLINIC | Facility: CLINIC | Age: 43
End: 2023-06-12

## 2023-06-12 DIAGNOSIS — I77.810 ASCENDING AORTA DILATATION (HCC): Primary | ICD-10-CM

## 2023-06-12 NOTE — TELEPHONE ENCOUNTER
----- Message from Sanjiv Jimenez 83 sent at 6/10/2023  2:16 PM EDT -----  Please let Vishal Riziv know that her echocardiogram showed normal heart function  Her ascending aorta was just minimally dilated  I would recommend she have an aortic aneurysm screen  She can have this done through her PCP or our office, whichever she prefers  If she wishes for me to order please let me know  Thank you!

## 2023-06-12 NOTE — TELEPHONE ENCOUNTER
Called and spoke with patient and provided central scheduling phone to get test scheduled    Patient verbally understood

## 2023-06-16 LAB
DME PARACHUTE DELIVERY DATE ACTUAL: NORMAL
DME PARACHUTE DELIVERY DATE EXPECTED: NORMAL
DME PARACHUTE DELIVERY DATE REQUESTED: NORMAL
DME PARACHUTE DELIVERY NOTE: NORMAL
DME PARACHUTE ITEM DESCRIPTION: NORMAL
DME PARACHUTE ORDER STATUS: NORMAL
DME PARACHUTE SUPPLIER NAME: NORMAL
DME PARACHUTE SUPPLIER PHONE: NORMAL

## 2023-06-21 ENCOUNTER — HOSPITAL ENCOUNTER (OUTPATIENT)
Dept: CT IMAGING | Facility: HOSPITAL | Age: 43
Discharge: HOME | End: 2023-06-21
Payer: COMMERCIAL

## 2023-06-21 ENCOUNTER — TELEPHONE (OUTPATIENT)
Dept: SLEEP CENTER | Facility: CLINIC | Age: 43
End: 2023-06-21

## 2023-06-21 DIAGNOSIS — I77.810 ASCENDING AORTA DILATATION (HCC): ICD-10-CM

## 2023-06-21 PROCEDURE — 74177 CT ABD & PELVIS W/CONTRAST: CPT

## 2023-06-21 PROCEDURE — 71260 CT THORAX DX C+: CPT

## 2023-06-21 PROCEDURE — G1004 CDSM NDSC: HCPCS

## 2023-06-21 RX ADMIN — IOHEXOL 100 ML: 350 INJECTION, SOLUTION INTRAVENOUS at 14:50

## 2023-06-21 NOTE — TELEPHONE ENCOUNTER
6-16-23 @ [facility], per [place] [birth year]  Pt was set up on 6/16/23 by Hernan Mataer on a resmed S11 set at 5-15cm flex 3 and gave mask N20 medium

## 2023-06-26 ENCOUNTER — PREP FOR PROCEDURE (OUTPATIENT)
Dept: BARIATRICS | Facility: CLINIC | Age: 43
End: 2023-06-26

## 2023-06-26 DIAGNOSIS — G47.33 OBSTRUCTIVE SLEEP APNEA: ICD-10-CM

## 2023-06-26 DIAGNOSIS — K21.9 GASTROESOPHAGEAL REFLUX DISEASE: ICD-10-CM

## 2023-06-26 DIAGNOSIS — E66.9 OBESITY, UNSPECIFIED: Primary | ICD-10-CM

## 2023-06-28 DIAGNOSIS — Z01.818 PREOPERATIVE TESTING: ICD-10-CM

## 2023-06-28 DIAGNOSIS — Z87.891 EX-SMOKER: Primary | ICD-10-CM

## 2023-07-05 ENCOUNTER — TELEPHONE (OUTPATIENT)
Dept: OTHER | Facility: OTHER | Age: 43
End: 2023-07-05

## 2023-07-05 NOTE — TELEPHONE ENCOUNTER
Pt wants to make sure her appointment for tomorrow is on the telephone and not in person and if she can possibly have her appointment earlier in the day     Please give her a call back

## 2023-07-06 ENCOUNTER — OFFICE VISIT (OUTPATIENT)
Dept: BARIATRICS | Facility: CLINIC | Age: 43
End: 2023-07-06

## 2023-07-06 VITALS — WEIGHT: 219 LBS | HEIGHT: 65 IN | BODY MASS INDEX: 36.49 KG/M2

## 2023-07-06 DIAGNOSIS — E66.9 OBESITY, CLASS II, BMI 35-39.9: Primary | ICD-10-CM

## 2023-07-06 PROCEDURE — RECHECK

## 2023-07-06 NOTE — PROGRESS NOTES
Bariatric Nutrition Follow Up Note    Type of surgery    Preop  Surgery Date: 2023  Surgeon: Dr. Tima Pittman  44 y.o.  female     Wt with BMI of 25: 148#  Pre-Op Excess Wt: 66#  Height 5' 4.5" (1.638 m), weight 99.3 kg (219 lb). Body mass index is 37.01 kg/m². [place] Jeor Equation-2150 kcals to maintain wt                        Estimated protein needs 67-80g                        Estimated fluid needs >80 oz    Weight History   Onset of Obesity: Adult  Family history of obesity: Yes  Wt Loss Attempts: FAD Diets (Cabbage soup, Grapefruit, Cleanse, etc.)  Meal Replacements (Medifast, Slim Fast, etc.)  Self Created Diets (Portion Control, Healthy Food Choices, etc.)  Maximum Wt Lost: 20# on Be Balanced (like Keto)    Review of History and Medications   Past Medical History:   Diagnosis Date   • GERD (gastroesophageal reflux disease)    • Glaucoma    • Hyperlipidemia    • Hypertension    • PCOS (polycystic ovarian syndrome)    • Seasonal allergies      Past Surgical History:   Procedure Laterality Date   •  SECTION     • DILATION AND CURETTAGE OF UTERUS      x11   • WISDOM TOOTH EXTRACTION       Social History     Socioeconomic History   • Marital status: /Civil Union     Spouse name: Not on file   • Number of children: Not on file   • Years of education: Not on file   • Highest education level: Not on file   Occupational History   • Not on file   Tobacco Use   • Smoking status: Former     Types: Cigarettes     Quit date: 3/16/2023     Years since quittin.3   • Smokeless tobacco: Never   Vaping Use   • Vaping Use: Former   Substance and Sexual Activity   • Alcohol use:  Yes     Alcohol/week: 4.0 standard drinks of alcohol     Types: 4 Cans of beer per week     Comment: SOCIAL   • Drug use: Never   • Sexual activity: Not on file     Comment: defer   Other Topics Concern   • Not on file   Social History Narrative   • Not on file     Social Determinants of Health     Financial Resource Strain: Not on file   Food Insecurity: Not on file   Transportation Needs: Not on file   Physical Activity: Not on file   Stress: Not on file   Social Connections: Not on file   Intimate Partner Violence: Not on file   Housing Stability: Not on file       Current Outpatient Medications:   •  desoximetasone (TOPICORT) 0.25 % ointment, , Disp: , Rfl:   •  fexofenadine (ALLEGRA) 180 MG tablet, , Disp: , Rfl:   •  fluticasone (FLONASE) 50 mcg/act nasal spray, 2 sprays, Disp: , Rfl:   •  furosemide (LASIX) 20 mg tablet, Take 20 mg by mouth daily as needed As needed for swelling, Disp: , Rfl:   •  Jolessa 0.15-0.03 MG per tablet, , Disp: , Rfl:   •  latanoprost (XALATAN) 0.005 % ophthalmic solution, , Disp: , Rfl:   •  lisinopril (ZESTRIL) 10 mg tablet, , Disp: , Rfl:   •  omeprazole (PriLOSEC) 40 MG capsule, Take 40 mg by mouth daily, Disp: , Rfl:   •  valACYclovir (VALTREX) 1,000 mg tablet, Take 1 tablet (1,000 mg total) by mouth 2 (two) times a day for 7 days, Disp: 14 tablet, Rfl: 1  Food Intake and Lifestyle Assessment   Food Intake Assessment completed via usual diet recall  Breakfast:  Protein shakes (Fairlife)  Snack: 0   Lunch: leftovers or rotisserie chicken on wrap, or chicken salad on crackers  Snack:  Saint [place]ia yogurt (15g)  Dinner: protein and vegetable, occ starch  Snack: 0  Beverage intake: water  Protein supplement: sometimes a Fairlife  Estimated protein intake per day: 80g on day of shake  Estimated fluid intake per day: 90 oz  Meals eaten away from home: at least 3 times/week  Typical meal pattern: 3 meals per day and 0-1 snacks per day  Eating Behaviors: Cutting down on portions, drinking shakes  Food allergies or intolerances:    Allergies   Allergen Reactions   • Doxycycline GI Intolerance     Cultural or Holiness considerations: N/A    Physical Assessment  Physical Activity  Types of exercise: None  Current physical limitations: none    Psychosocial Assessment   Support systems: spouse sister in law(had WLS 1 year ago)  Socioeconomic factors: Lives with  and son, works from home    Nutrition Diagnosis  Diagnosis: Overweight / Obesity (NC-3.3)  Related to: Physical inactivity and Excessive energy intake  As Evidenced by: BMI >25     Nutrition Prescription: Recommend the following diet  Low fat, Low sugar and High protein    Interventions and Teaching   Discussed pre-op and post-op nutrition guidelines. Patient educated and handouts provided. Surgical changes to stomach / GI  Capacity of post-surgery stomach  Diet progression  Adequate hydration  Sugar and fat restriction to decrease "dumping syndrome"  Fat restriction to decrease steatorrhea  Expected weight loss  Weight loss plateaus/ possibility of weight regain  Exercise  Suggestions for pre-op diet  Nutrition considerations after surgery  Protein supplements  Meal planning and preparation  Appropriate carbohydrate, protein, and fat intake, and food/fluid choices to maximize safe weight loss, nutrient intake, and tolerance   Dietary and lifestyle changes  Possible problems with poor eating habits  Intuitive eating  Techniques for self monitoring and keeping daily food journal  Potential for food intolerance after surgery, and ways to deal with them including: lactose intolerance, nausea, reflux, vomiting, diarrhea, food intolerance, appetite changes, gas  Vitamin / Mineral supplementation of Multivitamin with minerals and Vitamin D  Post-operative pregnancy guidelines    Education provided to: patient    Barriers to learning: No barriers identified  Readiness to change: preparation    Prior research on procedure: discussed with provider    Comprehension: verbalizes understanding     Expected Compliance: good  Recommendations  Pt is an appropriate candidate for surgery.  Yes  Evaluation / Monitoring  Dietitian to Monitor: Eating pattern as discussed Body weight Lab values Physical activity  Workflow:   • Psych and/or D+A Clearance: N/A  • Blood Work: done  • PCP letter: done  • Support Group: done  • Surgeon Appt: done  • EGD: done  • Cardiac Risk Assessment: done  • Sleep Studies: picking up CPAP 6/16/23  • Nicotine test: needs repeat  • 6 Month Pre-Operative Program: no wt checks required  • Weight Loss: stable wt      Goals  Food journal, Complete lession plans 1-6, Eat 3 meals per day and exercise 20 minutes 3 times/week    Time Spent:   30 minutes

## 2023-08-17 ENCOUNTER — OFFICE VISIT (OUTPATIENT)
Dept: BARIATRICS | Facility: CLINIC | Age: 43
End: 2023-08-17

## 2023-08-17 VITALS — WEIGHT: 217.2 LBS | BODY MASS INDEX: 36.19 KG/M2 | HEIGHT: 65 IN

## 2023-08-17 DIAGNOSIS — E66.9 OBESITY, CLASS II, BMI 35-39.9: Primary | ICD-10-CM

## 2023-08-17 PROCEDURE — RECHECK

## 2023-08-17 NOTE — PROGRESS NOTES
Bariatric Nutrition Follow Up Note    Type of surgery    Preop  Surgery Date: 2023  Surgeon: Dr. Nola Barnes  44 y.o.  female     Wt with BMI of 25: 148#  Pre-Op Excess Wt: 66#  There were no vitals taken for this visit. There is no height or weight on file to calculate BMI. [place]an Equation-2150 kcals to maintain wt                        Estimated protein needs 67-80g                        Estimated fluid needs >80 oz    Weight History   Onset of Obesity: Adult  Family history of obesity: Yes  Wt Loss Attempts: FAD Diets (Cabbage soup, Grapefruit, Cleanse, etc.)  Meal Replacements (Medifast, Slim Fast, etc.)  Self Created Diets (Portion Control, Healthy Food Choices, etc.)  Maximum Wt Lost: 20# on Be Balanced (like Keto)    Review of History and Medications   Past Medical History:   Diagnosis Date   • GERD (gastroesophageal reflux disease)    • Glaucoma    • Hyperlipidemia    • Hypertension    • PCOS (polycystic ovarian syndrome)    • Seasonal allergies      Past Surgical History:   Procedure Laterality Date   •  SECTION     • DILATION AND CURETTAGE OF UTERUS      x11   • WISDOM TOOTH EXTRACTION       Social History     Socioeconomic History   • Marital status: /Civil Union     Spouse name: Not on file   • Number of children: Not on file   • Years of education: Not on file   • Highest education level: Not on file   Occupational History   • Not on file   Tobacco Use   • Smoking status: Former     Types: Cigarettes     Quit date: 3/16/2023     Years since quittin.4   • Smokeless tobacco: Never   Vaping Use   • Vaping Use: Former   Substance and Sexual Activity   • Alcohol use:  Yes     Alcohol/week: 4.0 standard drinks of alcohol     Types: 4 Cans of beer per week     Comment: SOCIAL   • Drug use: Never   • Sexual activity: Not on file     Comment: defer   Other Topics Concern   • Not on file   Social History Narrative   • Not on file Social Determinants of Health     Financial Resource Strain: Not on file   Food Insecurity: Not on file   Transportation Needs: Not on file   Physical Activity: Not on file   Stress: Not on file   Social Connections: Not on file   Intimate Partner Violence: Not on file   Housing Stability: Not on file       Current Outpatient Medications:   •  desoximetasone (TOPICORT) 0.25 % ointment, , Disp: , Rfl:   •  fexofenadine (ALLEGRA) 180 MG tablet, , Disp: , Rfl:   •  fluticasone (FLONASE) 50 mcg/act nasal spray, 2 sprays, Disp: , Rfl:   •  furosemide (LASIX) 20 mg tablet, Take 20 mg by mouth daily as needed As needed for swelling, Disp: , Rfl:   •  Jolessa 0.15-0.03 MG per tablet, , Disp: , Rfl:   •  latanoprost (XALATAN) 0.005 % ophthalmic solution, , Disp: , Rfl:   •  lisinopril (ZESTRIL) 10 mg tablet, , Disp: , Rfl:   •  omeprazole (PriLOSEC) 40 MG capsule, Take 40 mg by mouth daily, Disp: , Rfl:   •  valACYclovir (VALTREX) 1,000 mg tablet, Take 1 tablet (1,000 mg total) by mouth 2 (two) times a day for 7 days, Disp: 14 tablet, Rfl: 1  Food Intake and Lifestyle Assessment   Food Intake Assessment completed via usual diet recall  Breakfast:  Protein shakes (Fairlife)  Snack: 0   Lunch: leftovers or rotisserie chicken on wrap, or chicken salad on crackers  Snack:  [place] yogurt (15g)  Dinner: protein and vegetable, occ starch  Snack: 0  Beverage intake: water  Protein supplement: sometimes a Fairlife  Estimated protein intake per day: 80g on day of shake  Estimated fluid intake per day: 90 oz  Meals eaten away from home: at least 3 times/week  Typical meal pattern: 3 meals per day and 0-1 snacks per day  Eating Behaviors: Cutting down on portions, drinking shakes  Food allergies or intolerances:    Allergies   Allergen Reactions   • Doxycycline GI Intolerance     Cultural or Restoration considerations: N/A    Physical Assessment  Physical Activity  Types of exercise: None  Current physical limitations: none    Psychosocial Assessment   Support systems: spouse sister in law(had WLS 1 year ago)  Socioeconomic factors: Lives with  and son, works from home    Nutrition Diagnosis  Diagnosis: Overweight / Obesity (NC-3.3)  Related to: Physical inactivity and Excessive energy intake  As Evidenced by: BMI >25     Nutrition Prescription: Recommend the following diet  Low fat, Low sugar and High protein    Interventions and Teaching   Discussed pre-op and post-op nutrition guidelines. Patient educated and handouts provided. Surgical changes to stomach / GI  Capacity of post-surgery stomach  Diet progression  Adequate hydration  Sugar and fat restriction to decrease "dumping syndrome"  Fat restriction to decrease steatorrhea  Expected weight loss  Weight loss plateaus/ possibility of weight regain  Exercise  Suggestions for pre-op diet  Nutrition considerations after surgery  Protein supplements  Meal planning and preparation  Appropriate carbohydrate, protein, and fat intake, and food/fluid choices to maximize safe weight loss, nutrient intake, and tolerance   Dietary and lifestyle changes  Possible problems with poor eating habits  Intuitive eating  Techniques for self monitoring and keeping daily food journal  Potential for food intolerance after surgery, and ways to deal with them including: lactose intolerance, nausea, reflux, vomiting, diarrhea, food intolerance, appetite changes, gas  Vitamin / Mineral supplementation of Multivitamin with minerals and Vitamin D  Post-operative pregnancy guidelines    Education provided to: patient    Barriers to learning: No barriers identified  Readiness to change: preparation    Prior research on procedure: discussed with provider    Comprehension: verbalizes understanding     Expected Compliance: good  Recommendations  Pt is an appropriate candidate for surgery.  Yes  Evaluation / Monitoring  Dietitian to Monitor: Eating pattern as discussed Body weight Lab values Physical activity  Workflow:   • Psych and/or D+A Clearance: N/A  • Blood Work: done  • PCP letter: done  • Support Group: done  • Surgeon Appt: done  • EGD: done  • Cardiac Risk Assessment: done  • Sleep Studies: done has CPAP  • Nicotine test: negative  • 6 Month Pre-Operative Program: no wt checks required  • Weight Loss: stable wt      Goals  Food journal, Complete lession plans 1-6, Eat 3 meals per day and exercise 20 minutes 3 times/week    Time Spent:   30 minutes

## 2023-08-22 ENCOUNTER — OFFICE VISIT (OUTPATIENT)
Dept: SLEEP CENTER | Facility: CLINIC | Age: 43
End: 2023-08-22
Payer: COMMERCIAL

## 2023-08-22 VITALS
DIASTOLIC BLOOD PRESSURE: 78 MMHG | BODY MASS INDEX: 36.15 KG/M2 | HEART RATE: 83 BPM | HEIGHT: 65 IN | SYSTOLIC BLOOD PRESSURE: 117 MMHG | WEIGHT: 217 LBS

## 2023-08-22 DIAGNOSIS — G47.33 OSA (OBSTRUCTIVE SLEEP APNEA): Primary | ICD-10-CM

## 2023-08-22 PROCEDURE — 99214 OFFICE O/P EST MOD 30 MIN: CPT | Performed by: PHYSICIAN ASSISTANT

## 2023-08-22 RX ORDER — LISINOPRIL AND HYDROCHLOROTHIAZIDE 12.5; 1 MG/1; MG/1
TABLET ORAL
COMMUNITY
Start: 2023-08-05

## 2023-08-22 NOTE — PATIENT INSTRUCTIONS
Please continue to use your CPAP nightly. Your compliance report looks great. The only recommendation I could potentially make is to increase your hours of usage. I placed an order for a mask fitting so you may try a nasal cradle mask with a hose that comes from the top of the head which may make it more comfortable for you. Like to follow back with you in approximately 6 to 9 months to reevaluate your sleep apnea and possibly order a new sleep study to reevaluate if you are near your weight loss goal.    Nursing Support:  When: Monday through Friday 7A-5PM except holidays  Where: Our direct line is [phone]. If you are having a true emergency please call 911. In the event that the line is busy or it is after hours please leave a voice message and we will return your call. Please speak clearly, leaving your full name, birth date, best number to reach you and the reason for your call. Medication refills: We will need the name of the medication, the dosage, the ordering provider, whether you get a 30 or 90 day refill, and the pharmacy name and address. Medications will be ordered by the provider only. Nurses cannot call in prescriptions. Please allow 7 days for medication refills. Physician requested updates: If your provider requested that you call with an update after starting medication, please be ready to provide us the medication and dosage, what time you take your medication, the time you attempt to fall asleep, time you fall asleep, when you wake up, and what time you get out of bed. Sleep Study Results: We will contact you with sleep study results and/or next steps after the physician has reviewed your testing.

## 2023-08-22 NOTE — ASSESSMENT & PLAN NOTE
She was diagnosed with mild obstructive sleep apnea on a home sleep study performed on 5/17/2023 with an KVNG of 10. She is currently using her CPAP 87% of the time greater than 4 hours 60% of the time with a residual AHI of 0.3. I placed an order for a mask fitting as she would prefer to move to a nasal cradle mask with a hose attachment from the top of her head. She will continue to use it nightly and follow-up with us in approximately 6 to 8 months to reevaluate. If she is near her weight loss goal at that point, I would recommend ordering a repeat sleep study as weight loss can potentially improve or even resolve her obstructive sleep apnea.

## 2023-08-22 NOTE — PROGRESS NOTES
[facility] 44 [facility]   :1980, MRN: [id number]  2023          Follow Up Evaluation / Problem:     Mild Obstructive Sleep Apnea      Thank you for the opportunity of participating in the evaluation and care of this patient in the Sleep Clinic at [address] [place] [address]. HPI: Dewight Apgar is a 44y.o. year old female. The patient presents for follow up of obstructive sleep apnea. She had a home study on 23, which showed mild obstructive sleep apnea. Given her history of hypertension and planned bariatric surgery she elected to being CPAP therapy. She presents to review compliance and effectiveness of treatment. Current Outpatient Medications:   •  desoximetasone (TOPICORT) 0.25 % ointment, , Disp: , Rfl:   •  fexofenadine (ALLEGRA) 180 MG tablet, , Disp: , Rfl:   •  fluticasone (FLONASE) 50 mcg/act nasal spray, 2 sprays, Disp: , Rfl:   •  furosemide (LASIX) 20 mg tablet, Take 20 mg by mouth daily as needed As needed for swelling, Disp: , Rfl:   •  latanoprost (XALATAN) 0.005 % ophthalmic solution, , Disp: , Rfl:   •  lisinopril-hydrochlorothiazide (PRINZIDE,ZESTORETIC) 10-12.5 MG per tablet, , Disp: , Rfl:   •  omeprazole (PriLOSEC) 40 MG capsule, Take 40 mg by mouth daily, Disp: , Rfl:   •  Jolessa 0.15-0.03 MG per tablet, , Disp: , Rfl:   •  lisinopril (ZESTRIL) 10 mg tablet, , Disp: , Rfl:   •  valACYclovir (VALTREX) 1,000 mg tablet, Take 1 tablet (1,000 mg total) by mouth 2 (two) times a day for 7 days, Disp: 14 tablet, Rfl: 1    How likely are you to doze off or fall asleep in the following situations, in contrast to feeling just tired? Sitting and reading: Slight chance of dozing  Watching TV: Slight chance of dozing  Sitting, inactive in a public place (e.g. a theatre or a meeting):  Would never doze  As a passenger in a car for an hour without a break: Slight chance of dozing  Lying down to rest in the afternoon when circumstances permit: Slight chance of dozing  Sitting and talking to someone: Would never doze  Sitting quietly after a lunch without alcohol: Would never doze  In a car, while stopped for a few minutes in traffic: Would never doze  Total score: 4              Vitals:    08/22/23 1345   BP: 117/78   BP Location: Right arm   Patient Position: Sitting   Cuff Size: Adult   Pulse: 83   Weight: 98.4 kg (217 lb)   Height: 5' 4.5" (1.638 m)       Body mass index is 36.67 kg/m². EPWORTH SLEEPINESS SCORE  Total score: 4      Past History Since Last Sleep Center Visit:     Patient states overall she has no significant complaints with her CPAP. He does feel she would prefer to try a nasal cradle mask with the hose that comes from the top of her head versus a nasal mask that she is using with the front attachment. She feels would be easier for her to sleep on her side with this equipment. She states that time she is up during the night worrying about her 59-year-old son when he is out. She states when she wakes up for this it is difficult for her to return to sleep using her CPAP machine. She states she is using it as much as possible. She is scheduled for bariatric surgery in September. The patient reports that she cleans the equipment appropriately and changes supplies on a regular basis.     The review of systems and following portions of the patient's history were reviewed and updated as appropriate: allergies, current medications, past family history, past medical history, past social history, past surgical history, and problem list.        OBJECTIVE  Equipment set up date:  6/16/23, ResMed S11   PAP Pressure: Nasal AutoPAP using a lower limit of 5 cm and an upper limit of 15 cm of water pressure  Type of mask used: nasal, switching to nasal cradle   DME Provider: Stephanie Summers  Denies aerophagia or AM headaches    Physical Exam:     General Appearance:   Alert, cooperative, no distress, appears stated age, obese          ASSESSMENT / PLAN    1. MAGGY (obstructive sleep apnea)  Assessment & Plan:  She was diagnosed with mild obstructive sleep apnea on a home sleep study performed on 5/17/2023 with an KVNG of 10. She is currently using her CPAP 87% of the time greater than 4 hours 60% of the time with a residual AHI of 0.3. I placed an order for a mask fitting as she would prefer to move to a nasal cradle mask with a hose attachment from the top of her head. She will continue to use it nightly and follow-up with us in approximately 6 to 8 months to reevaluate. If she is near her weight loss goal at that point, I would recommend ordering a repeat sleep study as weight loss can potentially improve or even resolve her obstructive sleep apnea. Orders:  -     Mask fitting only; Future; Expected date: 08/22/2023           Counseling / Coordination of Care  I have spent a total time of 32 minutes on 08/22/23 in caring for this patient including Diagnostic results, Prognosis, Risks and benefits of tx options, Instructions for management, Patient and family education, Importance of tx compliance, Risk factor reductions, Impressions, Counseling / Coordination of care, Documenting in the medical record, Reviewing / ordering tests, medicine, procedures   and Obtaining or reviewing history  . Today I reviewed the patient's compliance data. she has been able to use the equipment 87% of all days recorded. Average usage was 4 or more hours 60% of all days recorded. The patient uses the equipment for an average of 4 hours and 31 minutes per night. The estimated AHI is 0.3 abnormal breathing events per hour. The patient feels they benefit from the use of PAP equipment and would like to continue PAP therapy. Response to treatment has been good. A prescription for supplies has been provided to last for the next year.     She will continue using this equipment at the settings noted above for the next 6-9 months. At that timeshe will then return for a routine follow-up evaluation. I have asked the patient to contact the [facility] if she encounters any difficulties prior to that time. The following instructions have been given to the patient today:    Patient Instructions   Please continue to use your CPAP nightly. Your compliance report looks great. The only recommendation I could potentially make is to increase your hours of usage. I placed an order for a mask fitting so you may try a nasal cradle mask with a hose that comes from the top of the head which may make it more comfortable for you. Like to follow back with you in approximately 6 to 9 months to reevaluate your sleep apnea and possibly order a new sleep study to reevaluate if you are near your weight loss goal.    Nursing Support:  When: Monday through Friday 7A-5PM except holidays  Where: Our direct line is [phone]. If you are having a true emergency please call 911. In the event that the line is busy or it is after hours please leave a voice message and we will return your call. Please speak clearly, leaving your full name, birth date, best number to reach you and the reason for your call. Medication refills: We will need the name of the medication, the dosage, the ordering provider, whether you get a 30 or 90 day refill, and the pharmacy name and address. Medications will be ordered by the provider only. Nurses cannot call in prescriptions. Please allow 7 days for medication refills. Physician requested updates: If your provider requested that you call with an update after starting medication, please be ready to provide us the medication and dosage, what time you take your medication, the time you attempt to fall asleep, time you fall asleep, when you wake up, and what time you get out of bed. Sleep Study Results:  We will contact you with sleep study results and/or next steps after the physician has reviewed your testing.            Krystal Carrion PA-C  [address]

## 2023-09-06 ENCOUNTER — APPOINTMENT (OUTPATIENT)
Dept: LAB | Facility: CLINIC | Age: 43
DRG: 621 | End: 2023-09-06

## 2023-09-06 DIAGNOSIS — Z01.818 PREOPERATIVE TESTING: ICD-10-CM

## 2023-09-06 DIAGNOSIS — Z87.891 EX-SMOKER: ICD-10-CM

## 2023-09-06 PROCEDURE — G0480 DRUG TEST DEF 1-7 CLASSES: HCPCS

## 2023-09-06 PROCEDURE — 80323 ALKALOIDS NOS: CPT

## 2023-09-07 ENCOUNTER — CLINICAL SUPPORT (OUTPATIENT)
Dept: BARIATRICS | Facility: CLINIC | Age: 43
End: 2023-09-07

## 2023-09-07 ENCOUNTER — OFFICE VISIT (OUTPATIENT)
Dept: BARIATRICS | Facility: CLINIC | Age: 43
End: 2023-09-07
Payer: COMMERCIAL

## 2023-09-07 VITALS
WEIGHT: 220 LBS | TEMPERATURE: 97 F | BODY MASS INDEX: 36.65 KG/M2 | DIASTOLIC BLOOD PRESSURE: 74 MMHG | HEIGHT: 65 IN | SYSTOLIC BLOOD PRESSURE: 116 MMHG | HEART RATE: 80 BPM

## 2023-09-07 DIAGNOSIS — K44.9 HIATAL HERNIA: Primary | ICD-10-CM

## 2023-09-07 DIAGNOSIS — E66.9 OBESITY, CLASS II, BMI 35-39.9: ICD-10-CM

## 2023-09-07 DIAGNOSIS — E66.9 OBESITY, CLASS II, BMI 35-39.9: Primary | ICD-10-CM

## 2023-09-07 PROCEDURE — RECHECK: Performed by: DIETITIAN, REGISTERED

## 2023-09-07 PROCEDURE — 99213 OFFICE O/P EST LOW 20 MIN: CPT | Performed by: STUDENT IN AN ORGANIZED HEALTH CARE EDUCATION/TRAINING PROGRAM

## 2023-09-07 RX ORDER — CELECOXIB 200 MG/1
200 CAPSULE ORAL ONCE
OUTPATIENT
Start: 2023-09-07 | End: 2023-09-07

## 2023-09-07 RX ORDER — ACETAMINOPHEN 325 MG/1
975 TABLET ORAL ONCE
OUTPATIENT
Start: 2023-09-07 | End: 2023-09-07

## 2023-09-07 RX ORDER — SCOLOPAMINE TRANSDERMAL SYSTEM 1 MG/1
1 PATCH, EXTENDED RELEASE TRANSDERMAL ONCE
OUTPATIENT
Start: 2023-09-07 | End: 2023-09-07

## 2023-09-07 RX ORDER — ENOXAPARIN SODIUM 100 MG/ML
40 INJECTION SUBCUTANEOUS
OUTPATIENT
Start: 2023-09-08 | End: 2023-09-09

## 2023-09-07 RX ORDER — CEFAZOLIN SODIUM 2 G/50ML
2000 SOLUTION INTRAVENOUS ONCE
OUTPATIENT
Start: 2023-09-07 | End: 2023-09-07

## 2023-09-07 NOTE — H&P (VIEW-ONLY)
BARIATRIC HISTORY AND PHYSICAL - BARIATRIC SURGERY  Daysi Saab 44 y.o. female MRN: [id number]  Unit/Bed#:  Encounter: [id number]      HPI:  Daysi Saab is a 44 y.o. female who presents to review their preoperative workup and see if they are a good candidate to undergo robotic sleeve gastrectomy. Review of Systems   Constitutional: Negative for activity change and appetite change. HENT: Negative for congestion. Respiratory: Negative for cough and shortness of breath. Cardiovascular: Negative for chest pain. Gastrointestinal: Negative for abdominal distention and abdominal pain. Skin: Negative for color change. Neurological: Negative for dizziness.        Historical Information   Past Medical History:   Diagnosis Date   • GERD (gastroesophageal reflux disease)    • Glaucoma    • Hyperlipidemia    • Hypertension    • PCOS (polycystic ovarian syndrome)    • Seasonal allergies      Past Surgical History:   Procedure Laterality Date   •  SECTION     • DILATION AND CURETTAGE OF UTERUS      x11   • WISDOM TOOTH EXTRACTION       Social History   Social History     Substance and Sexual Activity   Alcohol Use Yes   • Alcohol/week: 4.0 standard drinks of alcohol   • Types: 4 Cans of beer per week    Comment: SOCIAL     Social History     Substance and Sexual Activity   Drug Use Never     Social History     Tobacco Use   Smoking Status Former   • Types: Cigarettes   • Quit date: 3/16/2023   • Years since quittin.4   Smokeless Tobacco Never     Family History: non-contributory    Meds/Allergies   all medications and allergies reviewed  Allergies   Allergen Reactions   • Doxycycline GI Intolerance       Objective     Current Vitals:      bowel movement  [unfilled]    Invasive Devices     Peripheral Intravenous Line  Duration           Peripheral IV 23 Distal;Right;Upper;Ventral (anterior) Antecubital 77 days                Physical Exam  Constitutional:       Appearance: Normal appearance. HENT:      Head: Normocephalic. Cardiovascular:      Rate and Rhythm: Normal rate and regular rhythm. Pulses: Normal pulses. Heart sounds: Normal heart sounds. Pulmonary:      Effort: Pulmonary effort is normal.      Breath sounds: Normal breath sounds. Abdominal:      Palpations: Abdomen is soft. Skin:     General: Skin is warm. Neurological:      General: No focal deficit present. Mental Status: She is alert and oriented to person, place, and time. Lab Results: I have personally reviewed pertinent lab results. Imaging: I have personally reviewed pertinent reports. EKG, Pathology, and Other Studies: I have personally reviewed pertinent reports. Code Status: [unfilled]  Advance Directive and Living Will:      Power of :    POLST:        Assessment/Plan:    The patient presented to review the preoperative workup and see if bariatric surgery is appropriate and indicated following the extensive preoperative workup and the enrollment in our weight loss program.  Preoperative workup was complete. Results were reviewed with the patient including the blood work results and the endoscopy findings and the biopsy results. We also reviewed the cardiology evaluation. The patient was determined to be a good candidate for robotic sleeve gastrectomy.   ----------------------------------------------------------------------  Smoking: No  Blood thinner use: No  Home pain medication use and who manages it: No  CPAP use: Yes (If yes, sleep study obtained and reminded pt to bring CPAP to hospital)  History of blood clots: No  Previous abdominal surgeries: C section x1  Cardiac clearance obtained: Yes   EKG performed: Yes  EGD prior to surgery: Yes  Screening Labs obtained (CBC, CMP): Yes  H. Pylori status: Negative  Medication allergies: Doxycycline  SLIM consult Post-Op: No  Reminded patient about 2 week pre-op liquid diet: No  10% body weight loss pre-operatively met/discussed: Yes  Consent signed: Yes  Pre-Op ERAS Orders placed: Yes  -----------------------------------------------------------------------  Risks and benefits explained one more time to the patient. Alternatives to surgery and alternative forms of surgery were also explained. Post-surgical commitment and after care programs were explained. Consent was signed. Questions were answered and concerns were addressed. Patient will need to start the 2 week liquid diet prior to surgery. Patient wishes to proceed. As per [address] [place] [address] guidelines, I had a discussion with the patient regarding their CODE STATUS in the perioperative period and the patient is level 1 or FULL CODE STATUS.     Ade Moran MD  Bariatric Surgery   9/7/2023  2:19 PM

## 2023-09-07 NOTE — TELEPHONE ENCOUNTER
Po med, Laparoscopic Sleeve Gastrectomy, Robotically Assisted & Intraoperative EGD. SX 9/26/2023 , Erik Johnson.     **ALLERGIES**    Doxycycline Not Specified GI Intolerance

## 2023-09-07 NOTE — PROGRESS NOTES
BARIATRIC HISTORY AND PHYSICAL - BARIATRIC SURGERY  Timi Portillo 44 y.o. female MRN: [id number]  Unit/Bed#:  Encounter: [id number]      HPI:  Timi Portillo is a 44 y.o. female who presents to review their preoperative workup and see if they are a good candidate to undergo robotic sleeve gastrectomy. Review of Systems   Constitutional: Negative for activity change and appetite change. HENT: Negative for congestion. Respiratory: Negative for cough and shortness of breath. Cardiovascular: Negative for chest pain. Gastrointestinal: Negative for abdominal distention and abdominal pain. Skin: Negative for color change. Neurological: Negative for dizziness.        Historical Information   Past Medical History:   Diagnosis Date   • GERD (gastroesophageal reflux disease)    • Glaucoma    • Hyperlipidemia    • Hypertension    • PCOS (polycystic ovarian syndrome)    • Seasonal allergies      Past Surgical History:   Procedure Laterality Date   •  SECTION     • DILATION AND CURETTAGE OF UTERUS      x11   • WISDOM TOOTH EXTRACTION       Social History   Social History     Substance and Sexual Activity   Alcohol Use Yes   • Alcohol/week: 4.0 standard drinks of alcohol   • Types: 4 Cans of beer per week    Comment: SOCIAL     Social History     Substance and Sexual Activity   Drug Use Never     Social History     Tobacco Use   Smoking Status Former   • Types: Cigarettes   • Quit date: 3/16/2023   • Years since quittin.4   Smokeless Tobacco Never     Family History: non-contributory    Meds/Allergies   all medications and allergies reviewed  Allergies   Allergen Reactions   • Doxycycline GI Intolerance       Objective     Current Vitals:      bowel movement  [unfilled]    Invasive Devices     Peripheral Intravenous Line  Duration           Peripheral IV 23 Distal;Right;Upper;Ventral (anterior) Antecubital 77 days                Physical Exam  Constitutional:       Appearance: Normal appearance. HENT:      Head: Normocephalic. Cardiovascular:      Rate and Rhythm: Normal rate and regular rhythm. Pulses: Normal pulses. Heart sounds: Normal heart sounds. Pulmonary:      Effort: Pulmonary effort is normal.      Breath sounds: Normal breath sounds. Abdominal:      Palpations: Abdomen is soft. Skin:     General: Skin is warm. Neurological:      General: No focal deficit present. Mental Status: She is alert and oriented to person, place, and time. Lab Results: I have personally reviewed pertinent lab results. Imaging: I have personally reviewed pertinent reports. EKG, Pathology, and Other Studies: I have personally reviewed pertinent reports. Code Status: [unfilled]  Advance Directive and Living Will:      Power of :    POLST:        Assessment/Plan:    The patient presented to review the preoperative workup and see if bariatric surgery is appropriate and indicated following the extensive preoperative workup and the enrollment in our weight loss program.  Preoperative workup was complete. Results were reviewed with the patient including the blood work results and the endoscopy findings and the biopsy results. We also reviewed the cardiology evaluation. The patient was determined to be a good candidate for robotic sleeve gastrectomy.   ----------------------------------------------------------------------  Smoking: No  Blood thinner use: No  Home pain medication use and who manages it: No  CPAP use: Yes (If yes, sleep study obtained and reminded pt to bring CPAP to hospital)  History of blood clots: No  Previous abdominal surgeries: C section x1  Cardiac clearance obtained: Yes   EKG performed: Yes  EGD prior to surgery: Yes  Screening Labs obtained (CBC, CMP): Yes  H. Pylori status: Negative  Medication allergies: Doxycycline  SLIM consult Post-Op: No  Reminded patient about 2 week pre-op liquid diet: No  10% body weight loss pre-operatively met/discussed: Yes  Consent signed: Yes  Pre-Op ERAS Orders placed: Yes  -----------------------------------------------------------------------  Risks and benefits explained one more time to the patient. Alternatives to surgery and alternative forms of surgery were also explained. Post-surgical commitment and after care programs were explained. Consent was signed. Questions were answered and concerns were addressed. Patient will need to start the 2 week liquid diet prior to surgery. Patient wishes to proceed. As per [facility] guidelines, I had a discussion with the patient regarding their CODE STATUS in the perioperative period and the patient is level 1 or FULL CODE STATUS.     Janneth Fischer MD  Bariatric Surgery   9/7/2023  2:19 PM

## 2023-09-08 RX ORDER — ACETAMINOPHEN 500 MG
1000 TABLET ORAL EVERY 6 HOURS PRN
COMMUNITY
End: 2023-09-27

## 2023-09-08 NOTE — PRE-PROCEDURE INSTRUCTIONS
Pre-Surgery Instructions:   Medication Instructions   • acetaminophen (TYLENOL) 500 mg tablet Uses PRN- DO NOT take day of surgery- surgeon ordered for DOS   • desoximetasone (TOPICORT) 0.25 % ointment Hold day of surgery. • fexofenadine (ALLEGRA) 180 MG tablet Take day of surgery. • fluticasone (FLONASE) 50 mcg/act nasal spray Uses PRN- OK to take day of surgery   • furosemide (LASIX) 20 mg tablet Hold day of surgery. • latanoprost (XALATAN) 0.005 % ophthalmic solution Take night before surgery   • lisinopril-hydrochlorothiazide (PRINZIDE,ZESTORETIC) 10-12.5 MG per tablet Hold day of surgery. • omeprazole (PriLOSEC) 40 MG capsule Take day of surgery. • valACYclovir (VALTREX) 1,000 mg tablet Uses PRN- OK to take day of surgery   See above    . Medication instructions for day surgery reviewed. Please use only a sip of water to take your instructed medications. Avoid all over the counter vitamins, supplements and NSAIDS for one week prior to surgery per anesthesia guidelines. Tylenol is ok to take as needed. You will receive a call one business day prior to surgery with an arrival time and hospital directions. If your surgery is scheduled on a Monday, the hospital will be calling you on the Friday prior to your surgery. If you have not heard from anyone by 8pm, please call the hospital supervisor through the hospital  at [phone]. Pete Carpio [phone]). Do not eat or drink anything after midnight the night before your surgery, including candy, mints, lifesavers, or chewing gum. Do not drink alcohol 24hrs before your surgery. Try not to smoke at least 24hrs before your surgery. Follow the pre surgery showering instructions as listed in the [facility] Surgical Experience Booklet” or otherwise provided by your surgeon's office. Do not shave the surgical area 24 hours before surgery.  Do not apply any lotions, creams, including makeup, cologne, deodorant, or perfumes after showering on the day of your surgery. No contact lenses, eye make-up, or artificial eyelashes. Remove nail polish, including gel polish, and any artificial, gel, or acrylic nails if possible. Remove all jewelry including rings and body piercing jewelry. Wear causal clothing that is easy to take on and off. Consider your type of surgery. Keep any valuables, jewelry, piercings at home. Please bring any specially ordered equipment (sling, braces) if indicated. Arrange for a responsible person to drive you to and from the hospital on the day of your surgery. Visitor Guidelines discussed. Call the surgeon's office with any new illnesses, exposures, or additional questions prior to surgery. Please reference your [facility] Surgical Experience Booklet” for additional information to prepare for your upcoming surgery.

## 2023-09-11 RX ORDER — OMEPRAZOLE 20 MG/1
20 CAPSULE, DELAYED RELEASE ORAL DAILY
Qty: 30 CAPSULE | Refills: 3 | Status: SHIPPED | OUTPATIENT
Start: 2023-09-11

## 2023-09-11 RX ORDER — ENOXAPARIN SODIUM 100 MG/ML
40 INJECTION SUBCUTANEOUS DAILY
Qty: 5.2 ML | Refills: 0 | Status: SHIPPED | OUTPATIENT
Start: 2023-09-27 | End: 2023-10-10

## 2023-09-11 RX ORDER — OXYCODONE HYDROCHLORIDE 5 MG/1
5 TABLET ORAL EVERY 4 HOURS PRN
Qty: 10 TABLET | Refills: 0 | Status: SHIPPED | OUTPATIENT
Start: 2023-09-27

## 2023-09-12 LAB
COTININE SERPL-MCNC: <1 NG/ML
NICOTINE SERPL-MCNC: <1 NG/ML

## 2023-09-20 ENCOUNTER — TELEPHONE (OUTPATIENT)
Dept: BARIATRICS | Facility: CLINIC | Age: 43
End: 2023-09-20

## 2023-09-20 NOTE — TELEPHONE ENCOUNTER
Pre-op call was made to patient, message left, to follow up on how they are doing and to remind them to continue with all medical and dietary directions that were given at pre-op class regarding liver shrinking diet and hydration. They were encouraged to purchase all vitamins and protein shakes for post op use as well as to begin Miralax three days prior to surgery as directed in Section 6 of their manual.  They were reminded of the Ensure Pre-surgery drinks protocol and to bring their completed yellow form with them to surgery as well as their CPAP-BiPAP machine if they use one. Lastly, they were informed that they would be weighed the morning of surgery and to give the office a call if they had any further questions or concerns.

## 2023-09-25 ENCOUNTER — ANESTHESIA EVENT (OUTPATIENT)
Dept: PERIOP | Facility: HOSPITAL | Age: 43
DRG: 621 | End: 2023-09-25

## 2023-09-26 ENCOUNTER — HOSPITAL ENCOUNTER (INPATIENT)
Facility: HOSPITAL | Age: 43
LOS: 1 days | Discharge: HOME | DRG: 621 | End: 2023-09-27
Attending: SURGERY | Admitting: SURGERY

## 2023-09-26 ENCOUNTER — ANESTHESIA (OUTPATIENT)
Dept: PERIOP | Facility: HOSPITAL | Age: 43
DRG: 621 | End: 2023-09-26

## 2023-09-26 DIAGNOSIS — E66.9 OBESITY, CLASS II, BMI 35-39.9: Primary | ICD-10-CM

## 2023-09-26 DIAGNOSIS — K21.9 GASTROESOPHAGEAL REFLUX DISEASE: ICD-10-CM

## 2023-09-26 DIAGNOSIS — I10 ESSENTIAL (PRIMARY) HYPERTENSION: ICD-10-CM

## 2023-09-26 DIAGNOSIS — G47.33 OBSTRUCTIVE SLEEP APNEA: ICD-10-CM

## 2023-09-26 DIAGNOSIS — E66.9 OBESITY, UNSPECIFIED: ICD-10-CM

## 2023-09-26 LAB
EXT PREGNANCY TEST URINE: NEGATIVE
EXT. CONTROL: NORMAL
GLUCOSE SERPL-MCNC: 134 MG/DL (ref 65–140)
GLUCOSE SERPL-MCNC: 141 MG/DL (ref 65–140)

## 2023-09-26 PROCEDURE — 88302 TISSUE EXAM BY PATHOLOGIST: CPT | Performed by: STUDENT IN AN ORGANIZED HEALTH CARE EDUCATION/TRAINING PROGRAM

## 2023-09-26 PROCEDURE — 88307 TISSUE EXAM BY PATHOLOGIST: CPT | Performed by: STUDENT IN AN ORGANIZED HEALTH CARE EDUCATION/TRAINING PROGRAM

## 2023-09-26 PROCEDURE — C9290 INJ, BUPIVACAINE LIPOSOME: HCPCS | Performed by: STUDENT IN AN ORGANIZED HEALTH CARE EDUCATION/TRAINING PROGRAM

## 2023-09-26 PROCEDURE — 43775 LAP SLEEVE GASTRECTOMY: CPT | Performed by: STUDENT IN AN ORGANIZED HEALTH CARE EDUCATION/TRAINING PROGRAM

## 2023-09-26 PROCEDURE — 0DB64Z3 EXCISION OF STOMACH, PERCUTANEOUS ENDOSCOPIC APPROACH, VERTICAL: ICD-10-PCS | Performed by: SURGERY

## 2023-09-26 PROCEDURE — 43281 LAP PARAESOPHAG HERN REPAIR: CPT | Performed by: SURGERY

## 2023-09-26 PROCEDURE — 43775 LAP SLEEVE GASTRECTOMY: CPT | Performed by: SURGERY

## 2023-09-26 PROCEDURE — S2900 ROBOTIC SURGICAL SYSTEM: HCPCS | Performed by: SURGERY

## 2023-09-26 PROCEDURE — 0BQT4ZZ REPAIR DIAPHRAGM, PERCUTANEOUS ENDOSCOPIC APPROACH: ICD-10-PCS | Performed by: SURGERY

## 2023-09-26 PROCEDURE — 8E0W4CZ ROBOTIC ASSISTED PROCEDURE OF TRUNK REGION, PERCUTANEOUS ENDOSCOPIC APPROACH: ICD-10-PCS | Performed by: SURGERY

## 2023-09-26 PROCEDURE — 82948 REAGENT STRIP/BLOOD GLUCOSE: CPT

## 2023-09-26 PROCEDURE — 0DJ08ZZ INSPECTION OF UPPER INTESTINAL TRACT, VIA NATURAL OR ARTIFICIAL OPENING ENDOSCOPIC: ICD-10-PCS | Performed by: SURGERY

## 2023-09-26 PROCEDURE — 43281 LAP PARAESOPHAG HERN REPAIR: CPT | Performed by: STUDENT IN AN ORGANIZED HEALTH CARE EDUCATION/TRAINING PROGRAM

## 2023-09-26 PROCEDURE — 81025 URINE PREGNANCY TEST: CPT | Performed by: STUDENT IN AN ORGANIZED HEALTH CARE EDUCATION/TRAINING PROGRAM

## 2023-09-26 RX ORDER — PROPOFOL 10 MG/ML
INJECTION, EMULSION INTRAVENOUS AS NEEDED
Status: DISCONTINUED | OUTPATIENT
Start: 2023-09-26 | End: 2023-09-26

## 2023-09-26 RX ORDER — SCOLOPAMINE TRANSDERMAL SYSTEM 1 MG/1
1 PATCH, EXTENDED RELEASE TRANSDERMAL ONCE
Status: DISCONTINUED | OUTPATIENT
Start: 2023-09-26 | End: 2023-09-26

## 2023-09-26 RX ORDER — CEFAZOLIN SODIUM 2 G/50ML
2000 SOLUTION INTRAVENOUS ONCE
Status: COMPLETED | OUTPATIENT
Start: 2023-09-26 | End: 2023-09-26

## 2023-09-26 RX ORDER — ACETAMINOPHEN 325 MG/1
975 TABLET ORAL EVERY 8 HOURS
Status: DISCONTINUED | OUTPATIENT
Start: 2023-09-26 | End: 2023-09-27 | Stop reason: HOSPADM

## 2023-09-26 RX ORDER — FAMOTIDINE 10 MG/ML
20 INJECTION, SOLUTION INTRAVENOUS 2 TIMES DAILY
Status: DISCONTINUED | OUTPATIENT
Start: 2023-09-26 | End: 2023-09-27 | Stop reason: HOSPADM

## 2023-09-26 RX ORDER — SIMETHICONE 80 MG
80 TABLET,CHEWABLE ORAL 4 TIMES DAILY PRN
Status: DISCONTINUED | OUTPATIENT
Start: 2023-09-26 | End: 2023-09-27 | Stop reason: HOSPADM

## 2023-09-26 RX ORDER — FENTANYL CITRATE 50 UG/ML
INJECTION, SOLUTION INTRAMUSCULAR; INTRAVENOUS AS NEEDED
Status: DISCONTINUED | OUTPATIENT
Start: 2023-09-26 | End: 2023-09-26

## 2023-09-26 RX ORDER — MORPHINE SULFATE 4 MG/ML
4 INJECTION, SOLUTION INTRAMUSCULAR; INTRAVENOUS EVERY 4 HOURS PRN
Status: DISCONTINUED | OUTPATIENT
Start: 2023-09-26 | End: 2023-09-27 | Stop reason: HOSPADM

## 2023-09-26 RX ORDER — SODIUM CHLORIDE, SODIUM LACTATE, POTASSIUM CHLORIDE, CALCIUM CHLORIDE 600; 310; 30; 20 MG/100ML; MG/100ML; MG/100ML; MG/100ML
100 INJECTION, SOLUTION INTRAVENOUS CONTINUOUS
Status: DISCONTINUED | OUTPATIENT
Start: 2023-09-26 | End: 2023-09-27 | Stop reason: HOSPADM

## 2023-09-26 RX ORDER — HYDROMORPHONE HCL/PF 1 MG/ML
0.5 SYRINGE (ML) INJECTION
Status: DISCONTINUED | OUTPATIENT
Start: 2023-09-26 | End: 2023-09-26 | Stop reason: HOSPADM

## 2023-09-26 RX ORDER — METOCLOPRAMIDE HYDROCHLORIDE 5 MG/ML
10 INJECTION INTRAMUSCULAR; INTRAVENOUS EVERY 6 HOURS PRN
Status: DISCONTINUED | OUTPATIENT
Start: 2023-09-26 | End: 2023-09-27 | Stop reason: HOSPADM

## 2023-09-26 RX ORDER — SODIUM CHLORIDE, SODIUM LACTATE, POTASSIUM CHLORIDE, CALCIUM CHLORIDE 600; 310; 30; 20 MG/100ML; MG/100ML; MG/100ML; MG/100ML
INJECTION, SOLUTION INTRAVENOUS CONTINUOUS PRN
Status: DISCONTINUED | OUTPATIENT
Start: 2023-09-26 | End: 2023-09-26

## 2023-09-26 RX ORDER — DIPHENHYDRAMINE HCL 25 MG
25 TABLET ORAL EVERY 8 HOURS PRN
Status: DISCONTINUED | OUTPATIENT
Start: 2023-09-26 | End: 2023-09-27 | Stop reason: HOSPADM

## 2023-09-26 RX ORDER — ACETAMINOPHEN 160 MG/5ML
975 SUSPENSION ORAL EVERY 8 HOURS
Status: DISCONTINUED | OUTPATIENT
Start: 2023-09-26 | End: 2023-09-27 | Stop reason: HOSPADM

## 2023-09-26 RX ORDER — ENOXAPARIN SODIUM 100 MG/ML
40 INJECTION SUBCUTANEOUS
Status: COMPLETED | OUTPATIENT
Start: 2023-09-26 | End: 2023-09-26

## 2023-09-26 RX ORDER — CELECOXIB 200 MG/1
200 CAPSULE ORAL ONCE
Status: COMPLETED | OUTPATIENT
Start: 2023-09-26 | End: 2023-09-26

## 2023-09-26 RX ORDER — ONDANSETRON 2 MG/ML
INJECTION INTRAMUSCULAR; INTRAVENOUS AS NEEDED
Status: DISCONTINUED | OUTPATIENT
Start: 2023-09-26 | End: 2023-09-26

## 2023-09-26 RX ORDER — PHENYLEPHRINE HCL IN 0.9% NACL 1 MG/10 ML
SYRINGE (ML) INTRAVENOUS AS NEEDED
Status: DISCONTINUED | OUTPATIENT
Start: 2023-09-26 | End: 2023-09-26

## 2023-09-26 RX ORDER — LIDOCAINE HYDROCHLORIDE 20 MG/ML
INJECTION, SOLUTION EPIDURAL; INFILTRATION; INTRACAUDAL; PERINEURAL AS NEEDED
Status: DISCONTINUED | OUTPATIENT
Start: 2023-09-26 | End: 2023-09-26

## 2023-09-26 RX ORDER — MIDAZOLAM HYDROCHLORIDE 2 MG/2ML
INJECTION, SOLUTION INTRAMUSCULAR; INTRAVENOUS AS NEEDED
Status: DISCONTINUED | OUTPATIENT
Start: 2023-09-26 | End: 2023-09-26

## 2023-09-26 RX ORDER — ACETAMINOPHEN 325 MG/1
975 TABLET ORAL ONCE
Status: COMPLETED | OUTPATIENT
Start: 2023-09-26 | End: 2023-09-26

## 2023-09-26 RX ORDER — ONDANSETRON 2 MG/ML
4 INJECTION INTRAMUSCULAR; INTRAVENOUS ONCE AS NEEDED
Status: COMPLETED | OUTPATIENT
Start: 2023-09-26 | End: 2023-09-26

## 2023-09-26 RX ORDER — BUPIVACAINE HYDROCHLORIDE 5 MG/ML
INJECTION, SOLUTION EPIDURAL; INTRACAUDAL AS NEEDED
Status: DISCONTINUED | OUTPATIENT
Start: 2023-09-26 | End: 2023-09-26 | Stop reason: HOSPADM

## 2023-09-26 RX ORDER — OXYCODONE HCL 5 MG/5 ML
5 SOLUTION, ORAL ORAL EVERY 4 HOURS PRN
Status: DISCONTINUED | OUTPATIENT
Start: 2023-09-26 | End: 2023-09-27 | Stop reason: HOSPADM

## 2023-09-26 RX ORDER — HYDROMORPHONE HCL/PF 1 MG/ML
SYRINGE (ML) INJECTION AS NEEDED
Status: DISCONTINUED | OUTPATIENT
Start: 2023-09-26 | End: 2023-09-26

## 2023-09-26 RX ORDER — EPHEDRINE SULFATE 50 MG/ML
INJECTION INTRAVENOUS AS NEEDED
Status: DISCONTINUED | OUTPATIENT
Start: 2023-09-26 | End: 2023-09-26

## 2023-09-26 RX ORDER — GLYCOPYRROLATE 0.2 MG/ML
INJECTION INTRAMUSCULAR; INTRAVENOUS AS NEEDED
Status: DISCONTINUED | OUTPATIENT
Start: 2023-09-26 | End: 2023-09-26

## 2023-09-26 RX ORDER — SODIUM CHLORIDE 9 MG/ML
INJECTION INTRAVENOUS AS NEEDED
Status: DISCONTINUED | OUTPATIENT
Start: 2023-09-26 | End: 2023-09-26 | Stop reason: HOSPADM

## 2023-09-26 RX ORDER — ENOXAPARIN SODIUM 100 MG/ML
40 INJECTION SUBCUTANEOUS EVERY 24 HOURS
Status: DISCONTINUED | OUTPATIENT
Start: 2023-09-27 | End: 2023-09-27 | Stop reason: HOSPADM

## 2023-09-26 RX ORDER — PROMETHAZINE HYDROCHLORIDE 25 MG/ML
25 INJECTION, SOLUTION INTRAMUSCULAR; INTRAVENOUS EVERY 6 HOURS PRN
Status: DISCONTINUED | OUTPATIENT
Start: 2023-09-26 | End: 2023-09-27 | Stop reason: HOSPADM

## 2023-09-26 RX ORDER — INSULIN LISPRO 100 [IU]/ML
1-6 INJECTION, SOLUTION INTRAVENOUS; SUBCUTANEOUS EVERY 6 HOURS SCHEDULED
Status: DISCONTINUED | OUTPATIENT
Start: 2023-09-26 | End: 2023-09-27 | Stop reason: HOSPADM

## 2023-09-26 RX ORDER — FENTANYL CITRATE/PF 50 MCG/ML
50 SYRINGE (ML) INJECTION
Status: DISCONTINUED | OUTPATIENT
Start: 2023-09-26 | End: 2023-09-26 | Stop reason: HOSPADM

## 2023-09-26 RX ORDER — SODIUM CHLORIDE 9 MG/ML
125 INJECTION, SOLUTION INTRAVENOUS CONTINUOUS
Status: DISCONTINUED | OUTPATIENT
Start: 2023-09-26 | End: 2023-09-27 | Stop reason: HOSPADM

## 2023-09-26 RX ORDER — ROCURONIUM BROMIDE 10 MG/ML
INJECTION, SOLUTION INTRAVENOUS AS NEEDED
Status: DISCONTINUED | OUTPATIENT
Start: 2023-09-26 | End: 2023-09-26

## 2023-09-26 RX ORDER — ONDANSETRON 2 MG/ML
4 INJECTION INTRAMUSCULAR; INTRAVENOUS EVERY 6 HOURS PRN
Status: DISCONTINUED | OUTPATIENT
Start: 2023-09-26 | End: 2023-09-27 | Stop reason: HOSPADM

## 2023-09-26 RX ORDER — MAGNESIUM HYDROXIDE 1200 MG/15ML
LIQUID ORAL AS NEEDED
Status: DISCONTINUED | OUTPATIENT
Start: 2023-09-26 | End: 2023-09-26 | Stop reason: HOSPADM

## 2023-09-26 RX ORDER — OXYCODONE HCL 5 MG/5 ML
10 SOLUTION, ORAL ORAL EVERY 4 HOURS PRN
Status: DISCONTINUED | OUTPATIENT
Start: 2023-09-26 | End: 2023-09-27 | Stop reason: HOSPADM

## 2023-09-26 RX ADMIN — Medication 200 MCG: at 11:00

## 2023-09-26 RX ADMIN — SODIUM CHLORIDE, SODIUM LACTATE, POTASSIUM CHLORIDE, AND CALCIUM CHLORIDE: .6; .31; .03; .02 INJECTION, SOLUTION INTRAVENOUS at 11:44

## 2023-09-26 RX ADMIN — MIDAZOLAM 2 MG: 1 INJECTION INTRAMUSCULAR; INTRAVENOUS at 10:32

## 2023-09-26 RX ADMIN — OXYCODONE HYDROCHLORIDE 5 MG: 5 SOLUTION ORAL at 21:45

## 2023-09-26 RX ADMIN — Medication 100 MCG: at 10:46

## 2023-09-26 RX ADMIN — Medication 200 MCG: at 11:31

## 2023-09-26 RX ADMIN — FENTANYL CITRATE 50 MCG: 50 INJECTION, SOLUTION INTRAMUSCULAR; INTRAVENOUS at 12:23

## 2023-09-26 RX ADMIN — Medication 200 MCG: at 10:55

## 2023-09-26 RX ADMIN — Medication 100 MCG: at 10:48

## 2023-09-26 RX ADMIN — MORPHINE SULFATE 4 MG: 4 INJECTION INTRAVENOUS at 15:52

## 2023-09-26 RX ADMIN — FENTANYL CITRATE 50 MCG: 50 INJECTION, SOLUTION INTRAMUSCULAR; INTRAVENOUS at 10:32

## 2023-09-26 RX ADMIN — SODIUM CHLORIDE, SODIUM LACTATE, POTASSIUM CHLORIDE, AND CALCIUM CHLORIDE: .6; .31; .03; .02 INJECTION, SOLUTION INTRAVENOUS at 11:06

## 2023-09-26 RX ADMIN — ACETAMINOPHEN 325MG 975 MG: 325 TABLET ORAL at 07:46

## 2023-09-26 RX ADMIN — ONDANSETRON 4 MG: 2 INJECTION INTRAMUSCULAR; INTRAVENOUS at 13:00

## 2023-09-26 RX ADMIN — FENTANYL CITRATE 50 MCG: 0.05 INJECTION, SOLUTION INTRAMUSCULAR; INTRAVENOUS at 13:24

## 2023-09-26 RX ADMIN — SODIUM CHLORIDE 125 ML/HR: 0.9 INJECTION, SOLUTION INTRAVENOUS at 08:06

## 2023-09-26 RX ADMIN — PROPOFOL 200 MG: 10 INJECTION, EMULSION INTRAVENOUS at 10:34

## 2023-09-26 RX ADMIN — ENOXAPARIN SODIUM 40 MG: 40 INJECTION SUBCUTANEOUS at 08:05

## 2023-09-26 RX ADMIN — EPHEDRINE SULFATE 15 MG: 50 INJECTION, SOLUTION INTRAVENOUS at 11:29

## 2023-09-26 RX ADMIN — GLYCOPYRROLATE 0.2 MG: 0.2 INJECTION, SOLUTION INTRAMUSCULAR; INTRAVENOUS at 10:47

## 2023-09-26 RX ADMIN — ROCURONIUM BROMIDE 20 MG: 10 INJECTION, SOLUTION INTRAVENOUS at 10:53

## 2023-09-26 RX ADMIN — SODIUM CHLORIDE 125 ML/HR: 0.9 INJECTION, SOLUTION INTRAVENOUS at 13:16

## 2023-09-26 RX ADMIN — HYDROMORPHONE HYDROCHLORIDE 0.5 MG: 1 INJECTION, SOLUTION INTRAMUSCULAR; INTRAVENOUS; SUBCUTANEOUS at 11:24

## 2023-09-26 RX ADMIN — Medication 200 MCG: at 11:37

## 2023-09-26 RX ADMIN — CEFAZOLIN SODIUM 2000 MG: 2 SOLUTION INTRAVENOUS at 10:37

## 2023-09-26 RX ADMIN — Medication 100 MCG: at 11:52

## 2023-09-26 RX ADMIN — SUGAMMADEX 400 MG: 100 INJECTION, SOLUTION INTRAVENOUS at 12:33

## 2023-09-26 RX ADMIN — Medication 200 MCG: at 10:51

## 2023-09-26 RX ADMIN — FAMOTIDINE 20 MG: 10 INJECTION INTRAVENOUS at 21:49

## 2023-09-26 RX ADMIN — FENTANYL CITRATE 50 MCG: 0.05 INJECTION, SOLUTION INTRAMUSCULAR; INTRAVENOUS at 12:59

## 2023-09-26 RX ADMIN — EPHEDRINE SULFATE 15 MG: 50 INJECTION, SOLUTION INTRAVENOUS at 11:34

## 2023-09-26 RX ADMIN — ROCURONIUM BROMIDE 20 MG: 10 INJECTION, SOLUTION INTRAVENOUS at 11:55

## 2023-09-26 RX ADMIN — ROCURONIUM BROMIDE 50 MG: 10 INJECTION, SOLUTION INTRAVENOUS at 10:34

## 2023-09-26 RX ADMIN — Medication 200 MCG: at 11:34

## 2023-09-26 RX ADMIN — Medication 200 MCG: at 11:17

## 2023-09-26 RX ADMIN — CELECOXIB 200 MG: 200 CAPSULE ORAL at 07:47

## 2023-09-26 RX ADMIN — LIDOCAINE HYDROCHLORIDE 80 MG: 20 INJECTION, SOLUTION EPIDURAL; INFILTRATION; INTRACAUDAL; PERINEURAL at 10:34

## 2023-09-26 RX ADMIN — ACETAMINOPHEN 975 MG: 325 SUSPENSION ORAL at 21:45

## 2023-09-26 RX ADMIN — ONDANSETRON 4 MG: 2 INJECTION INTRAMUSCULAR; INTRAVENOUS at 12:31

## 2023-09-26 NOTE — INTERVAL H&P NOTE
H&P reviewed. After examining the patient I find no changes in the patients condition since the H&P had been written.     Vitals:    09/26/23 0750   BP: 121/72   Pulse: 72   Resp: 16   Temp: 97.7 °F (36.5 °C)   SpO2: 97%

## 2023-09-26 NOTE — OP NOTE
OPERATIVE REPORT  PATIENT NAME: Umm Costa    :  1980  MRN: [id number]  Pt Location: [facility] OR ROOM 08    SURGERY DATE: 2023    Surgeon(s) and Role:     * Fozia Allen MD - Primary     * Mahendra Bolivar MD - Assisting     * Isa Penn PA-C - Assisting    Preop Diagnosis:  Obesity, unspecified [E66.9]  Obstructive sleep apnea [G47.33]  Gastroesophageal reflux disease [K21.9]  Essential (primary) hypertension [I10]    Post-Op Diagnosis Codes:     * Obesity, unspecified [E66.9]     * Obstructive sleep apnea [G47.33]     * Gastroesophageal reflux disease [K21.9]     * Essential (primary) hypertension [I10]    Procedure(s):  ROBOTIC SLEEVE; INTRAOP EGD  REPAIR HERNIA PARAESOPHAGEAL LAPAROSCOPIC W ROBOTICS    Specimen(s):  ID Type Source Tests Collected by Time Destination   1 : PORTION OF STOMACH Tissue Stomach TISSUE EXAM Fozia Allen MD 2023 1110    2 : Paraesophageal hernia sac Tissue Soft Tissue, Other TISSUE EXAM Fozia Allen MD 2023 1154        Estimated Blood Loss:   20 ml    Drains:  * No LDAs found *    Anesthesia Type:   General    Operative Indications:  Obesity, unspecified [E66.9]  Obstructive sleep apnea [G47.33]  Gastroesophageal reflux disease [K21.9]  Essential (primary) hypertension [I10]      Operative Findings:  Paraesophageal hernia    Complications:   None    Procedure and Technique:  Robotic paraesophageal hernia repair without mesh  Robotic sleeve gastrectomy   I was present for the entire procedure. Patient Disposition:  hemodynamically stable    No qualified resident was available  Assistant was necessary for instrument exchange and counter traction and assistance with stapling      Indication:   Patient suffers from morbid obesity and associated co-morbidities  and failed to achieve any meaningful or sustainable weight loss and was therefore consented to undergo a laparoscopic sleeve gastrectomy.  Risks and benefits were explained to the patient, patient consented for the procedure. Description of the procedure: The patient was brought to the operating room and placed in a supine position. The patient received a dose of IV antibiotics and a dose of subcutaneous Lovenox prior to the procedure. The patient was induced under general endotracheal anesthesia. The abdominal wall was prepped and draped under sterile conditions in the usual fashion. The procedure was started by obtaining access to the abdominal cavity using a Veress needle to the left side of the midline around 6 inches from the xiphoid the abdominal cavity was insufflated with CO2 to a pressure of 15 mmHg. After that, the abdomen was entered with an 8 mm trocar using an Optiview trocar under direct visualization. At that point, an 8-mm trocar and a 12-mm trocar were placed on the right side of the abdominal wall, under direct visualization, and another 8-mm trocar and 12 mm trocars were placed on the left side of the abdominal wall, also under direct visualization. A TAP block was performed using 20 ml of Exparel mixed with saline and 0.5 % Marcaine for a total of 100 ml. The patient was then placed in a reverse Trendelenburg position. A Steve retractor was placed through a small stab incision below the xiphoid and was used to retract the left lobe of the liver in a medial fashion. The robot was then docked and the arms locked in place. An OG tube was placed to decompress the stomach and then removed immediately. The angle of His was then dissected using the vessel sealer. The hiatus was initially inspected and the phrenoesophageal ligament was also dissected anteriorly and  a paraesophageal hernia was identified. The decision was then made to repair the hernia posteriorly. Right josseline and left josseline were dissected away from the hernia sac and skeletonized all the way down to the confluence. Esophagus was kept out of harm's way during the dissection.  The dissection was completed circumferentially around the esophagus. Right josseline and left josseline were then approximated using 0 Ethibond sutures placed in an interrupted fashion. After approximating the crura the anesthesiologist placed a 58 Welsh bougie to size the hiatus the bougie was then removed. At that point, we turned our attention to the pylorus and using the vessel sealer, the gastrocolic ligament was taken down starting 4 cm proximal to the pylorus, all the way up to the level of the short gastric vessels which were taken down with the vessel sealer as well. The angle of His was also dissected and all the posterior attachments of the fundus were taken down with the vessel sealerand with sharp dissection, the spleen was kept out of harm’s way and the left josseline was exposed and the stomach was completely mobilized off the left josseline and rotated medially. At that point, the anesthesiologist was asked to insert a 36-Welsh Bougie. The Bougie was secured in place by the anesthesiologist.       We started transecting the stomach using a 60 mm SureForm stapler, the compression feedback of the stapler was used to guide the choice of cartridges. The 36-Welsh Bougie was kept in place throughout the performance of the sleeve gastrectomy. We made particular attention during the transaction of the greater curvature to retract the stomach laterally at the site of the transected vessels  to prevent corkscrewing of the gastric sleeve. We also avoided getting too close to the incisura to prevent  narrowing at the level of the incisura. The staple line was hemostatic and well formed and there was no evidence of narrowing at the incisura. The staple line was then imbricated using 2-0 V LOC suture in a running fashion while the bougie is in place. At the end, the anesthesiologist was asked to remove the Veronicaport. The surgical field was inspected one more time and appeared hemostatic. We then removed the [facility] liver retractor.  The 12 mm port was extended, and then it was dilated. After that, the stomach specimen was retrieved and sent to Pathology. Sponge count was completed and was correct. The 12 mm port was then closed using #1 Vicryl in a simple fashion. All the trocars were removed under direct visualization, and the skin edges were approximated using 4-0 Monocryl in an interrupted, inverted fashion. Histoacryl was then applied to the skin incisions. The patient was extubated and transferred to the PACU in stable condition.     SIGNATURE: Ja Colby MD  DATE: September 26, 2023  TIME: 12:33 PM

## 2023-09-26 NOTE — ANESTHESIA POSTPROCEDURE EVALUATION
Post-Op Assessment Note    CV Status:  Stable    Pain management: adequate     Mental Status:  Alert and awake   Hydration Status:  Euvolemic   PONV Controlled:  Controlled   Airway Patency:  Patent      Post Op Vitals Reviewed: Yes      Staff: Anesthesiologist         No notable events documented.     BP      Temp     Pulse     Resp      SpO2      /68 (BP Location: Left arm)   Pulse 73   Temp (!) 97.2 °F (36.2 °C) (Axillary)   Resp 18   Ht 5' 4.5" (1.638 m)   Wt 99.3 kg (218 lb 14.7 oz)   SpO2 97%   BMI 37.00 kg/m²

## 2023-09-26 NOTE — PLAN OF CARE
Problem: PAIN - ADULT  Goal: Verbalizes/displays adequate comfort level or baseline comfort level  Description: Interventions:  - Encourage patient to monitor pain and request assistance  - Assess pain using appropriate pain scale  - Administer analgesics based on type and severity of pain and evaluate response  - Implement non-pharmacological measures as appropriate and evaluate response  - Consider cultural and social influences on pain and pain management  - Notify physician/advanced practitioner if interventions unsuccessful or patient reports new pain  Outcome: Progressing     Problem: INFECTION - ADULT  Goal: Absence or prevention of progression during hospitalization  Description: INTERVENTIONS:  - Assess and monitor for signs and symptoms of infection  - Monitor lab/diagnostic results  - Monitor all insertion sites, i.e. indwelling lines, tubes, and drains  - Monitor endotracheal if appropriate and nasal secretions for changes in amount and color  - [place] appropriate cooling/warming therapies per order  - Administer medications as ordered  - Instruct and encourage patient and family to use good hand hygiene technique  - Identify and instruct in appropriate isolation precautions for identified infection/condition  Outcome: Progressing     Problem: SAFETY ADULT  Goal: Patient will remain free of falls  Description: INTERVENTIONS:  - Educate patient/family on patient safety including physical limitations  - Instruct patient to call for assistance with activity   - Consult OT/PT to assist with strengthening/mobility   - Keep Call bell within reach  - Keep bed low and locked with side rails adjusted as appropriate  - Keep care items and personal belongings within reach  - Initiate and maintain comfort rounds  - Make Fall Risk Sign visible to staff  - Offer Toileting every 2 Hours, in advance of need  - Initiate/Maintain bed alarm  - Apply yellow socks and bracelet for high fall risk patients  - Consider moving patient to room near nurses station  Outcome: Progressing  Goal: Maintain or return to baseline ADL function  Description: INTERVENTIONS:  -  Assess patient's ability to carry out ADLs; assess patient's baseline for ADL function and identify physical deficits which impact ability to perform ADLs (bathing, care of mouth/teeth, toileting, grooming, dressing, etc.)  - Assess/evaluate cause of self-care deficits   - Assess range of motion  - Assess patient's mobility; develop plan if impaired  - Assess patient's need for assistive devices and provide as appropriate  - Encourage maximum independence but intervene and supervise when necessary  - Involve family in performance of ADLs  - Assess for home care needs following discharge   - Consider OT consult to assist with ADL evaluation and planning for discharge  - Provide patient education as appropriate  Outcome: Progressing  Goal: Maintains/Returns to pre admission functional level  Description: INTERVENTIONS:  - Perform BMAT or MOVE assessment daily.   - Set and communicate daily mobility goal to care team and patient/family/caregiver. - Collaborate with rehabilitation services on mobility goals if consulted  - Perform Range of Motion 3 times a day. - Reposition patient every 3 hours.   - Dangle patient 3 times a day  - Stand patient 3 times a day  - Ambulate patient 3 times a day  - Out of bed to chair 3 times a day   - Out of bed for meals 3 times a day  - Out of bed for toileting  - Record patient progress and toleration of activity level   Outcome: Progressing     Problem: DISCHARGE PLANNING  Goal: Discharge to home or other facility with appropriate resources  Description: INTERVENTIONS:  - Identify barriers to discharge w/patient and caregiver  - Arrange for needed discharge resources and transportation as appropriate  - Identify discharge learning needs (meds, wound care, etc.)  - Arrange for interpretive services to assist at discharge as needed  - Refer to Case Management Department for coordinating discharge planning if the patient needs post-hospital services based on physician/advanced practitioner order or complex needs related to functional status, cognitive ability, or social support system  Outcome: Progressing     Problem: GASTROINTESTINAL - ADULT  Goal: Minimal or absence of nausea and/or vomiting  Description: INTERVENTIONS:  - Administer IV fluids if ordered to ensure adequate hydration  - Maintain NPO status until nausea and vomiting are resolved  - Nasogastric tube if ordered  - Administer ordered antiemetic medications as needed  - Provide nonpharmacologic comfort measures as appropriate  - Advance diet as tolerated, if ordered  - Consider nutrition services referral to assist patient with adequate nutrition and appropriate food choices  Outcome: Progressing  Goal: Maintains adequate nutritional intake  Description: INTERVENTIONS:  - Monitor percentage of each meal consumed  - Identify factors contributing to decreased intake, treat as appropriate  - Assist with meals as needed  - Monitor I&O, weight, and lab values if indicated  - Obtain nutrition services referral as needed  Outcome: Progressing     Problem: GENITOURINARY - ADULT  Goal: Absence of urinary retention  Description: INTERVENTIONS:  - Assess patient’s ability to void and empty bladder  - Monitor I/O  - Bladder scan as needed  - Discuss with physician/AP medications to alleviate retention as needed  - Discuss catheterization for long term situations as appropriate  Outcome: Progressing

## 2023-09-27 VITALS
BODY MASS INDEX: 36.47 KG/M2 | TEMPERATURE: 97.8 F | SYSTOLIC BLOOD PRESSURE: 120 MMHG | HEART RATE: 80 BPM | WEIGHT: 218.92 LBS | RESPIRATION RATE: 18 BRPM | HEIGHT: 65 IN | DIASTOLIC BLOOD PRESSURE: 67 MMHG | OXYGEN SATURATION: 96 %

## 2023-09-27 LAB
ANION GAP SERPL CALCULATED.3IONS-SCNC: 6 MMOL/L
BUN SERPL-MCNC: 7 MG/DL (ref 5–25)
CALCIUM SERPL-MCNC: 8.7 MG/DL (ref 8.4–10.2)
CHLORIDE SERPL-SCNC: 104 MMOL/L (ref 96–108)
CO2 SERPL-SCNC: 26 MMOL/L (ref 21–32)
CREAT SERPL-MCNC: 0.62 MG/DL (ref 0.6–1.3)
ERYTHROCYTE [DISTWIDTH] IN BLOOD BY AUTOMATED COUNT: 13.8 % (ref 11.6–15.1)
GFR SERPL CREATININE-BSD FRML MDRD: 111 ML/MIN/1.73SQ M
GLUCOSE SERPL-MCNC: 127 MG/DL (ref 65–140)
GLUCOSE SERPL-MCNC: 129 MG/DL (ref 65–140)
HCT VFR BLD AUTO: 38 % (ref 34.8–46.1)
HGB BLD-MCNC: 12 G/DL (ref 11.5–15.4)
MCH RBC QN AUTO: 30.3 PG (ref 26.8–34.3)
MCHC RBC AUTO-ENTMCNC: 31.6 G/DL (ref 31.4–37.4)
MCV RBC AUTO: 96 FL (ref 82–98)
PLATELET # BLD AUTO: 225 THOUSANDS/UL (ref 149–390)
PMV BLD AUTO: 8.7 FL (ref 8.9–12.7)
POTASSIUM SERPL-SCNC: 3.6 MMOL/L (ref 3.5–5.3)
RBC # BLD AUTO: 3.96 MILLION/UL (ref 3.81–5.12)
SODIUM SERPL-SCNC: 136 MMOL/L (ref 135–147)
WBC # BLD AUTO: 8.83 THOUSAND/UL (ref 4.31–10.16)

## 2023-09-27 PROCEDURE — 85027 COMPLETE CBC AUTOMATED: CPT | Performed by: PHYSICIAN ASSISTANT

## 2023-09-27 PROCEDURE — NC001 PR NO CHARGE: Performed by: SURGERY

## 2023-09-27 PROCEDURE — 99024 POSTOP FOLLOW-UP VISIT: CPT | Performed by: STUDENT IN AN ORGANIZED HEALTH CARE EDUCATION/TRAINING PROGRAM

## 2023-09-27 PROCEDURE — 80048 BASIC METABOLIC PNL TOTAL CA: CPT | Performed by: PHYSICIAN ASSISTANT

## 2023-09-27 PROCEDURE — 82948 REAGENT STRIP/BLOOD GLUCOSE: CPT

## 2023-09-27 RX ORDER — ACETAMINOPHEN 160 MG/5ML
975 SUSPENSION ORAL EVERY 8 HOURS
Qty: 638.4 ML | Refills: 0 | Status: SHIPPED | OUTPATIENT
Start: 2023-09-27 | End: 2023-10-04

## 2023-09-27 RX ADMIN — ENOXAPARIN SODIUM 40 MG: 40 INJECTION SUBCUTANEOUS at 10:18

## 2023-09-27 RX ADMIN — OXYCODONE HYDROCHLORIDE 5 MG: 5 SOLUTION ORAL at 05:09

## 2023-09-27 RX ADMIN — OXYCODONE HYDROCHLORIDE 10 MG: 5 SOLUTION ORAL at 10:18

## 2023-09-27 RX ADMIN — Medication 2 SPRAY: at 05:08

## 2023-09-27 RX ADMIN — ONDANSETRON 4 MG: 2 INJECTION INTRAMUSCULAR; INTRAVENOUS at 05:02

## 2023-09-27 RX ADMIN — FAMOTIDINE 20 MG: 10 INJECTION INTRAVENOUS at 08:29

## 2023-09-27 RX ADMIN — ACETAMINOPHEN 975 MG: 325 SUSPENSION ORAL at 05:13

## 2023-09-27 NOTE — DISCHARGE SUMMARY
Discharge Summary - [place] Jasso 44 y.o. female MRN: [id number]    Unit/Bed#: E5 -01 Encounter: [id number]      Pre-Operative Diagnosis: Pre-Op Diagnosis Codes:     * Obesity, unspecified [E66.9]     * Obstructive sleep apnea [G47.33]     * Gastroesophageal reflux disease [K21.9]     * Essential (primary) hypertension [I10]    Post-Operative Diagnosis: Post-Op Diagnosis Codes:     * Obesity, unspecified [E66.9]     * Obstructive sleep apnea [G47.33]     * Gastroesophageal reflux disease [K21.9]     * Essential (primary) hypertension [I10]    Procedures Performed:  Procedure(s):  ROBOTIC SLEEVE; INTRAOP EGD  REPAIR HERNIA PARAESOPHAGEAL LAPAROSCOPIC W ROBOTICS    Surgeon: Anabelle Anthony MD    See H & P for full details of admission and Operative Note for full details of operations performed. Patient tolerated surgery well without complications. In the morning postoperative Day 1, the patient had mild nausea and abdominal pain. Tolerated a clear liquid diet without vomiting. Able to ambulate and voiding independently. Patient was deemed ready for discharge home on [facility]. Patient was seen and examined prior to discharge. Provisions for Follow-Up Care:  See After Visit Summary/Discharge Instructions for information related to follow-up care and home orders. Disposition: Home, in stable condition. Planned Readmission: No    Discharge Medications:  See After Visit Summary/Discharge Instructions for reconciled discharge medications provided to patient and family. Post Operative instructions: Reviewed with patient and/or family.     Signature:   Yanelis Olsen PA-C  Date: 9/27/2023 Time: 11:20 AM

## 2023-09-27 NOTE — PROGRESS NOTES
Progress Note - Bariatric Surgery   Yasmine Chappell 44 y.o. female MRN: [id number]  Unit/Bed#: E5 -01 Encounter: [id number]      Subjective/Objective     Subjective:  Patient with history of obesity POD1 s/p robotic sleeve gastrectomy and hiatal hernia repair . Patient denies fevers, chills, sweats, SOB, CP, calf pain. Pain adequately controlled on oral pain medication. Ambulating without assistance, voiding well, and using incentive spirometer. Tolerating liquid diet without nausea or vomiting today. Vitals and labs within normal limits. Objective:    /67 (BP Location: Left arm)   Pulse 80   Temp 97.8 °F (36.6 °C) (Oral)   Resp 18   Ht 5' 4.5" (1.638 m)   Wt 99.3 kg (218 lb 14.7 oz)   SpO2 96%   BMI 37.00 kg/m²       Intake/Output Summary (Last 24 hours) at 9/27/2023 0743  Last data filed at 9/27/2023 0501  Gross per 24 hour   Intake 3320 ml   Output 1625 ml   Net 1695 ml       Invasive Devices     Peripheral Intravenous Line  Duration           Peripheral IV 06/21/23 Distal;Right;Upper;Ventral (anterior) Antecubital 97 days    Peripheral IV 09/26/23 Dorsal (posterior); Right Hand <1 day                ROS: 10-point system completed. All negative except see HPI.     Physical Exam    General Appearance:    Alert, cooperative, no distress, appears stated age   Head:    Normocephalic, without obvious abnormality, atraumatic   Lungs:     Respirations unlabored   Heart:    Regular rate and rhythm   Abdomen:     Soft, appropriate tenderness, no masses, no organomegaly, non-distended   Extremities:   Extremities normal, atraumatic, no cyanosis or edema   Neurologic:  Incision:  Psych:   Normal strength and sensation    Clean, dry, and intact    Normal mood and affect       Lab, Imaging and other studies:  CBC:   Lab Results   Component Value Date    WBC 8.83 09/27/2023    HGB 12.0 09/27/2023    HCT 38.0 09/27/2023    MCV 96 09/27/2023     09/27/2023    RBC 3.96 09/27/2023    MCH 30.3 09/27/2023    MCHC 31.6 09/27/2023    RDW 13.8 09/27/2023    MPV 8.7 (L) 09/27/2023        VTE Mechanical Prophylaxis: sequential compression device    Assessment/Plan  1)  Patient with history of Obesity s/p robotic Sleeve Gastrectomy and hiatal hernia repair with stable post op course. Patient afebrile and hemodynamically stable. - Encourage PO fluids  - Recommend ambulation, use of SCDs when not ambulating, and incentive spirometry.   - Ok to give Lovenox   - Plan to D/C patient home today pending anticipated progression    Plan of care was discussed with patient.   Care plan discussed with Dr. Tamera Sanches MD  Bariatric Surgery  9/27/2023  7:43 AM

## 2023-09-27 NOTE — PLAN OF CARE
Problem: PAIN - ADULT  Goal: Verbalizes/displays adequate comfort level or baseline comfort level  Description: Interventions:  - Encourage patient to monitor pain and request assistance  - Assess pain using appropriate pain scale  - Administer analgesics based on type and severity of pain and evaluate response  - Implement non-pharmacological measures as appropriate and evaluate response  - Consider cultural and social influences on pain and pain management  - Notify physician/advanced practitioner if interventions unsuccessful or patient reports new pain  Outcome: Progressing     Problem: INFECTION - ADULT  Goal: Absence or prevention of progression during hospitalization  Description: INTERVENTIONS:  - Assess and monitor for signs and symptoms of infection  - Monitor lab/diagnostic results  - Monitor all insertion sites, i.e. indwelling lines, tubes, and drains  - Monitor endotracheal if appropriate and nasal secretions for changes in amount and color  - [place] appropriate cooling/warming therapies per order  - Administer medications as ordered  - Instruct and encourage patient and family to use good hand hygiene technique  - Identify and instruct in appropriate isolation precautions for identified infection/condition  Outcome: Progressing     Problem: SAFETY ADULT  Goal: Patient will remain free of falls  Description: INTERVENTIONS:  - Educate patient/family on patient safety including physical limitations  - Instruct patient to call for assistance with activity   - Consult OT/PT to assist with strengthening/mobility   - Keep Call bell within reach  - Keep bed low and locked with side rails adjusted as appropriate  - Keep care items and personal belongings within reach  - Initiate and maintain comfort rounds  - Make Fall Risk Sign visible to staff  - Offer Toileting every 2 Hours, in advance of need  - Initiate/Maintain bed alarm  - Apply yellow socks and bracelet for high fall risk patients  - Consider moving patient to room near nurses station  Outcome: Progressing  Goal: Maintain or return to baseline ADL function  Description: INTERVENTIONS:  -  Assess patient's ability to carry out ADLs; assess patient's baseline for ADL function and identify physical deficits which impact ability to perform ADLs (bathing, care of mouth/teeth, toileting, grooming, dressing, etc.)  - Assess/evaluate cause of self-care deficits   - Assess range of motion  - Assess patient's mobility; develop plan if impaired  - Assess patient's need for assistive devices and provide as appropriate  - Encourage maximum independence but intervene and supervise when necessary  - Involve family in performance of ADLs  - Assess for home care needs following discharge   - Consider OT consult to assist with ADL evaluation and planning for discharge  - Provide patient education as appropriate  Outcome: Progressing  Goal: Maintains/Returns to pre admission functional level  Description: INTERVENTIONS:  - Perform BMAT or MOVE assessment daily.   - Set and communicate daily mobility goal to care team and patient/family/caregiver. - Collaborate with rehabilitation services on mobility goals if consulted  - Perform Range of Motion 3 times a day. - Reposition patient every 3 hours.   - Dangle patient 3 times a day  - Stand patient 3 times a day  - Ambulate patient 3 times a day  - Out of bed to chair 3 times a day   - Out of bed for meals 3 times a day  - Out of bed for toileting  - Record patient progress and toleration of activity level   Outcome: Progressing     Problem: DISCHARGE PLANNING  Goal: Discharge to home or other facility with appropriate resources  Description: INTERVENTIONS:  - Identify barriers to discharge w/patient and caregiver  - Arrange for needed discharge resources and transportation as appropriate  - Identify discharge learning needs (meds, wound care, etc.)  - Arrange for interpretive services to assist at discharge as needed  - Refer to Case Management Department for coordinating discharge planning if the patient needs post-hospital services based on physician/advanced practitioner order or complex needs related to functional status, cognitive ability, or social support system  Outcome: Progressing     Problem: GASTROINTESTINAL - ADULT  Goal: Minimal or absence of nausea and/or vomiting  Description: INTERVENTIONS:  - Administer IV fluids if ordered to ensure adequate hydration  - Maintain NPO status until nausea and vomiting are resolved  - Nasogastric tube if ordered  - Administer ordered antiemetic medications as needed  - Provide nonpharmacologic comfort measures as appropriate  - Advance diet as tolerated, if ordered  - Consider nutrition services referral to assist patient with adequate nutrition and appropriate food choices  Outcome: Progressing  Goal: Maintains adequate nutritional intake  Description: INTERVENTIONS:  - Monitor percentage of each meal consumed  - Identify factors contributing to decreased intake, treat as appropriate  - Assist with meals as needed  - Monitor I&O, weight, and lab values if indicated  - Obtain nutrition services referral as needed  Outcome: Progressing     Problem: GENITOURINARY - ADULT  Goal: Absence of urinary retention  Description: INTERVENTIONS:  - Assess patient’s ability to void and empty bladder  - Monitor I/O  - Bladder scan as needed  - Discuss with physician/AP medications to alleviate retention as needed  - Discuss catheterization for long term situations as appropriate  Outcome: Progressing     Problem: Prexisting or High Potential for Compromised Skin Integrity  Goal: Skin integrity is maintained or improved  Description: INTERVENTIONS:  - Identify patients at risk for skin breakdown  - Assess and monitor skin integrity  - Assess and monitor nutrition and hydration status  - Monitor labs   - Assess for incontinence   - Turn and reposition patient  - Assist with mobility/ambulation  - Relieve pressure over bony prominences  - Avoid friction and shearing  - Provide appropriate hygiene as needed including keeping skin clean and dry  - Evaluate need for skin moisturizer/barrier cream  - Collaborate with interdisciplinary team   - Patient/family teaching  - Consider wound care consult   Outcome: Progressing

## 2023-09-27 NOTE — PLAN OF CARE
Problem: PAIN - ADULT  Goal: Verbalizes/displays adequate comfort level or baseline comfort level  Description: Interventions:  - Encourage patient to monitor pain and request assistance  - Assess pain using appropriate pain scale  - Administer analgesics based on type and severity of pain and evaluate response  - Implement non-pharmacological measures as appropriate and evaluate response  - Consider cultural and social influences on pain and pain management  - Notify physician/advanced practitioner if interventions unsuccessful or patient reports new pain  Outcome: Adequate for Discharge     Problem: INFECTION - ADULT  Goal: Absence or prevention of progression during hospitalization  Description: INTERVENTIONS:  - Assess and monitor for signs and symptoms of infection  - Monitor lab/diagnostic results  - Monitor all insertion sites, i.e. indwelling lines, tubes, and drains  - Monitor endotracheal if appropriate and nasal secretions for changes in amount and color  - [place] appropriate cooling/warming therapies per order  - Administer medications as ordered  - Instruct and encourage patient and family to use good hand hygiene technique  - Identify and instruct in appropriate isolation precautions for identified infection/condition  Outcome: Adequate for Discharge     Problem: SAFETY ADULT  Goal: Patient will remain free of falls  Description: INTERVENTIONS:  - Educate patient/family on patient safety including physical limitations  - Instruct patient to call for assistance with activity   - Consult OT/PT to assist with strengthening/mobility   - Keep Call bell within reach  - Keep bed low and locked with side rails adjusted as appropriate  - Keep care items and personal belongings within reach  - Initiate and maintain comfort rounds  - Make Fall Risk Sign visible to staff  - Offer Toileting every 2 Hours, in advance of need  - Initiate/Maintain bed alarm  - Apply yellow socks and bracelet for high fall risk patients  - Consider moving patient to room near nurses station  Outcome: Adequate for Discharge  Goal: Maintain or return to baseline ADL function  Description: INTERVENTIONS:  -  Assess patient's ability to carry out ADLs; assess patient's baseline for ADL function and identify physical deficits which impact ability to perform ADLs (bathing, care of mouth/teeth, toileting, grooming, dressing, etc.)  - Assess/evaluate cause of self-care deficits   - Assess range of motion  - Assess patient's mobility; develop plan if impaired  - Assess patient's need for assistive devices and provide as appropriate  - Encourage maximum independence but intervene and supervise when necessary  - Involve family in performance of ADLs  - Assess for home care needs following discharge   - Consider OT consult to assist with ADL evaluation and planning for discharge  - Provide patient education as appropriate  Outcome: Adequate for Discharge  Goal: Maintains/Returns to pre admission functional level  Description: INTERVENTIONS:  - Perform BMAT or MOVE assessment daily.   - Set and communicate daily mobility goal to care team and patient/family/caregiver. - Collaborate with rehabilitation services on mobility goals if consulted  - Perform Range of Motion 3 times a day. - Reposition patient every 3 hours.   - Dangle patient 3 times a day  - Stand patient 3 times a day  - Ambulate patient 3 times a day  - Out of bed to chair 3 times a day   - Out of bed for meals 3 times a day  - Out of bed for toileting  - Record patient progress and toleration of activity level   Outcome: Adequate for Discharge     Problem: DISCHARGE PLANNING  Goal: Discharge to home or other facility with appropriate resources  Description: INTERVENTIONS:  - Identify barriers to discharge w/patient and caregiver  - Arrange for needed discharge resources and transportation as appropriate  - Identify discharge learning needs (meds, wound care, etc.)  - Arrange for interpretive services to assist at discharge as needed  - Refer to Case Management Department for coordinating discharge planning if the patient needs post-hospital services based on physician/advanced practitioner order or complex needs related to functional status, cognitive ability, or social support system  Outcome: Adequate for Discharge     Problem: GASTROINTESTINAL - ADULT  Goal: Minimal or absence of nausea and/or vomiting  Description: INTERVENTIONS:  - Administer IV fluids if ordered to ensure adequate hydration  - Maintain NPO status until nausea and vomiting are resolved  - Nasogastric tube if ordered  - Administer ordered antiemetic medications as needed  - Provide nonpharmacologic comfort measures as appropriate  - Advance diet as tolerated, if ordered  - Consider nutrition services referral to assist patient with adequate nutrition and appropriate food choices  Outcome: Adequate for Discharge  Goal: Maintains adequate nutritional intake  Description: INTERVENTIONS:  - Monitor percentage of each meal consumed  - Identify factors contributing to decreased intake, treat as appropriate  - Assist with meals as needed  - Monitor I&O, weight, and lab values if indicated  - Obtain nutrition services referral as needed  Outcome: Adequate for Discharge     Problem: GENITOURINARY - ADULT  Goal: Absence of urinary retention  Description: INTERVENTIONS:  - Assess patient’s ability to void and empty bladder  - Monitor I/O  - Bladder scan as needed  - Discuss with physician/AP medications to alleviate retention as needed  - Discuss catheterization for long term situations as appropriate  Outcome: Adequate for Discharge     Problem: Prexisting or High Potential for Compromised Skin Integrity  Goal: Skin integrity is maintained or improved  Description: INTERVENTIONS:  - Identify patients at risk for skin breakdown  - Assess and monitor skin integrity  - Assess and monitor nutrition and hydration status  - Monitor labs   - Assess for incontinence   - Turn and reposition patient  - Assist with mobility/ambulation  - Relieve pressure over bony prominences  - Avoid friction and shearing  - Provide appropriate hygiene as needed including keeping skin clean and dry  - Evaluate need for skin moisturizer/barrier cream  - Collaborate with interdisciplinary team   - Patient/family teaching  - Consider wound care consult   Outcome: Adequate for Discharge

## 2023-09-27 NOTE — UTILIZATION REVIEW
Initial Clinical Review    Elective   Ip    surgical procedure    Age/Sex: 44 y.o. female     Surgery Date:   9/26/23    Procedure: ROBOTIC SLEEVE; INTRAOP EGD  REPAIR HERNIA PARAESOPHAGEAL LAPAROSCOPIC W ROBOTICS    Anesthesia:    general    Operative Findings: Paraesophageal hernia    POD#1 Progress Note:   9/27  Continue post op care. Tolerating diet. Continue  Pain control/antiemetics as needed.       Admission Orders: Date/Time/Statement:   Admission Orders (From admission, onward)     Ordered        09/26/23 0903  Inpatient Admission  Once                      Orders Placed This Encounter   Procedures   • Inpatient Admission     Standing Status:   Standing     Number of Occurrences:   1     Order Specific Question:   Level of Care     Answer:   Med Surg [16]     Order Specific Question:   Estimated length of stay     Answer:   Inpatient Only Surgery     Vital Signs: /67 (BP Location: Left arm)   Pulse 80   Temp 97.8 °F (36.6 °C) (Oral)   Resp 18   Ht 5' 4.5" (1.638 m)   Wt 99.3 kg (218 lb 14.7 oz)   SpO2 96%   BMI 37.00 kg/m²     Pertinent Labs/Diagnostic Test Results:     Results from last 7 days   Lab Units 09/27/23  0532   WBC Thousand/uL 8.83   HEMOGLOBIN g/dL 12.0   HEMATOCRIT % 38.0   PLATELETS Thousands/uL 225         Results from last 7 days   Lab Units 09/27/23  0532   SODIUM mmol/L 136   POTASSIUM mmol/L 3.6   CHLORIDE mmol/L 104   CO2 mmol/L 26   ANION GAP mmol/L 6   BUN mg/dL 7   CREATININE mg/dL 0.62   EGFR ml/min/1.73sq m 111   CALCIUM mg/dL 8.7         Results from last 7 days   Lab Units 09/26/23  2138 09/26/23  1608   POC GLUCOSE mg/dl 134 141*     Results from last 7 days   Lab Units 09/27/23  0532   GLUCOSE RANDOM mg/dL 129                     Diet:   Bariatric cl  liq    Mobility:   OOB as tolerated    DVT Prophylaxis:  SCD'S    Medications/Pain Control:   Scheduled Medications:  acetaminophen, 975 mg, Oral, Q8H   Or  acetaminophen, 975 mg, Oral, Q8H  enoxaparin, 40 mg, Subcutaneous, Q24H  famotidine, 20 mg, Intravenous, BID  insulin lispro, 1-6 Units, Subcutaneous, Q6H CHRIS      Continuous IV Infusions:  lactated ringers, 100 mL/hr, Intravenous, Continuous  sodium chloride, 125 mL/hr, Intravenous, Continuous      PRN Meds:  diphenhydrAMINE, 25 mg, Oral, Q8H PRN  metoclopramide, 10 mg, Intravenous, Q6H PRN  morphine injection, 4 mg, Intravenous, Q4H PRN  ondansetron, 4 mg, Intravenous, Q6H PRN  oxyCODONE, 10 mg, Oral, Q4H PRN  oxyCODONE, 5 mg, Oral, Q4H PRN  phenol, 2 spray, Mouth/Throat, Q2H PRN  promethazine, 25 mg, Intravenous, Q6H PRN  simethicone, 80 mg, Oral, 4x Daily PRN        Network Utilization Review Department  ATTENTION: Please call with any questions or concerns to [phone] and carefully listen to the prompts so that you are directed to the right person. All voicemails are confidential.  Sang Monique all requests for admission clinical reviews, approved or denied determinations and any other requests to dedicated fax number below belonging to the campus where the patient is receiving treatment.  List of dedicated fax numbers for the Facilities:  [place] DENIALS (Administrative/Medical Necessity) [phone] [facility] [facility] ([facility]s) [phone]   [address] [place] [address] Drive [phone]   [facility] [address] [facility] [phone]   [id number] [place] [address] [phone]   [address] [facility] [address] [facility]  [address] [phone]

## 2023-09-27 NOTE — NURSING NOTE
Patient provided with discharge instructions on home lovenox injections, and post op bariatric surgery teaching.  No further questions

## 2023-09-27 NOTE — DISCHARGE INSTR - AVS FIRST PAGE
Bariatric/Weight Loss Surgery  Hospital Discharge Instructions  ACTIVITY:  Progress as feels comfortable - a good rule is:  if you are doing something and it begins to hurt, stop doing the activity. Walk every hour while at home. You may walk stairs if you do so slowly  You may shower 48 hours after surgery. Do not scrub incision sites. Blot gently with clean towel to dry incisions. (see #4 below)   Use your incentive spirometer 10 times per hour while awake for 1 week after surgery. Do NOT drive for 48 hours after surgery. No driving 24 hours after taking certain prescription pain medications. Examples of such medication are Percocet, Darvocet, Oxycodone, Tylenol #3, and Tylenol with Codeine. DIET  Stay on a liquid diet for 7 days after your surgery date, sipping slowly. Refer to your manual for examples of choices. Remember to keep your liquids sugar free or low calorie. You may have protein drinks. Make sure to drink 48 to 64 ounces per day of fluids. You may advance to a pureed diet one week after surgery as instructed by your diet progression pamphlet. Once you get approval from your surgeon at your first post operative visit, you may advance to the soft diet and remain on soft diet for 8 weeks unless otherwise instructed. MEDICATIONS:  The abdominal nerve block will wear off during the first 1-2 days that you are home, and you may become sore (especially over incision site/sites where abdominal wall is sutured). This may create a pulling sensation, especially while moving around, and will fade over time. Continue to take your Tylenol and your pain medication as instructed. Start vitamins and minerals one week after surgery or when you start stage 3/puree diet. Anti-acid Medication as per prescription. Other medications as indicated on the Physician Patient Discharge Instructions form given to you at the time of discharge.   Make sure that you are splitting your pill or tablet medications in halves or fourths or even crushing them before you take them. Capsules should be opened and mixed with water or jello. You need to do this for at least 4 weeks after surgery. Eventually you will be able to take your medications the regular way as they were prescribed. You will need to consult with your Family Doctor in regards to all your prescribed medication, particularly those for blood pressure and diabetes. As you lose weight, medical conditions may change, requiring an alteration or elimination of the drug dose. Monitor blood pressure closely and call PCP with any concerns. Sleeve Gastrectomy patients ONLY:  Complete full course of lovenox injections! DO NOT TAKE BIRTH CONTROL(BC) MEDICATIONS, INSERT BC VAGINAL RINGS, OR PLACE IUD OR ANY OTHER BC METHODS UNTIL 31 DAYS FROM DAY OF DISCHARGE FROM HOSPITAL. THIS PLACES YOU AT HIGH RISK FOR A POTENTIALLY LIFE THREATENING BLOOD CLOT. Remember to always use barrier methods for birth control and speak to your GYN about using two forms of birth control to start 31 days after surgery. It is very important to avoid pregnancy until at least 18-24 months after surgery. INCISION CARE  You may shower and get incisions wet 2 days after surgery. No soaking tub baths or swimming for 30 days after surgery. Keep abdominal area and incisions clean. Use soap and water to create a good lather and rinse off. Do not scrub incisions. If you have a drain, empty the drain as the nurses instructed. FOLLOW-UP APPOINTMENT should be made for one week after discharge. Call surgeon’s office at [phone] to schedule an appointment.     CALL YOUR DOCTOR FOR:  pain not controlled by pain medications, a temperature greater than 101.5° F, any increase or change in drainage or redness from any incision, any vomiting or inability to keep liquids down, shortness of breath, shoulder pain, or bleeding

## 2023-09-27 NOTE — DISCHARGE INSTRUCTIONS
your medications from [address] in [address] [place] [address] or cut your pills and open capsules, mix with liquid to drink. Take Tylenol every 8 hours around the clock, unless instructed otherwise  Take your omeprazole daily  It is important to stay hydrated and follow your discharge diet progression   Mild nausea is ok as long as you can drink fluids, sip very slowly and get up and walk during any periods of nausea  You may shower normally after 48 hours, but do not scrub incision sites, blot gently with clean towel to dry incisions  Take home medications as usual unless instructed otherwise while in hospital  Follow up with Dr. Kirit Kerr and your PCP within the next week  Sleeve gastrectomy patients ONLY: Complete full course of lovenox injections!

## 2023-09-28 ENCOUNTER — TELEPHONE (OUTPATIENT)
Dept: MEDSURG UNIT | Facility: HOSPITAL | Age: 43
End: 2023-09-28

## 2023-09-28 NOTE — TELEPHONE ENCOUNTER
Post op follow up phone call completed. Pt is sipping liquids and has consumed 16 oz so far today. Using IS as instructed, reinforced importance of using IS to help prevent pneumonia. Pt does have intermittent cough and bringing up phlegm, not yellow or green. Afebrile. Ambulating about home without difficulty. Pt was instructed to monitor BP BID due to having BP medication d/c'd. Instructed to follow up with PCP if she notices and increase in BP off of medications. Pain controlled with analgesia. Reaffirmed examples of liquid diet over the next week. Pt stated understanding about discharge instructions and medication adjustments. Tolerating self administration of Lovenox injections without difficulty. Follow up appt with surgeon scheduled for next week. Instructed to call with any additional questions or concerns.

## 2023-10-02 PROCEDURE — 88307 TISSUE EXAM BY PATHOLOGIST: CPT | Performed by: STUDENT IN AN ORGANIZED HEALTH CARE EDUCATION/TRAINING PROGRAM

## 2023-10-02 PROCEDURE — 88302 TISSUE EXAM BY PATHOLOGIST: CPT | Performed by: STUDENT IN AN ORGANIZED HEALTH CARE EDUCATION/TRAINING PROGRAM

## 2023-10-05 ENCOUNTER — OFFICE VISIT (OUTPATIENT)
Dept: BARIATRICS | Facility: CLINIC | Age: 43
End: 2023-10-05

## 2023-10-05 VITALS
DIASTOLIC BLOOD PRESSURE: 87 MMHG | TEMPERATURE: 97.6 F | WEIGHT: 209.5 LBS | SYSTOLIC BLOOD PRESSURE: 128 MMHG | BODY MASS INDEX: 34.91 KG/M2 | HEART RATE: 82 BPM | HEIGHT: 65 IN

## 2023-10-05 DIAGNOSIS — E66.9 OBESITY, CLASS II, BMI 35-39.9: Primary | ICD-10-CM

## 2023-10-05 DIAGNOSIS — Z98.84 BARIATRIC SURGERY STATUS: ICD-10-CM

## 2023-10-05 DIAGNOSIS — Z98.84 BARIATRIC SURGERY STATUS: Primary | ICD-10-CM

## 2023-10-05 DIAGNOSIS — Z09 POSTOP CHECK: ICD-10-CM

## 2023-10-05 PROCEDURE — RECHECK: Performed by: DIETITIAN, REGISTERED

## 2023-10-05 PROCEDURE — 99024 POSTOP FOLLOW-UP VISIT: CPT | Performed by: SURGERY

## 2023-10-05 RX ORDER — LISINOPRIL AND HYDROCHLOROTHIAZIDE 12.5; 1 MG/1; MG/1
1 TABLET ORAL DAILY
COMMUNITY
Start: 2023-07-06 | End: 2024-07-05

## 2023-10-05 NOTE — PROGRESS NOTES
POST OP UP VISIT - BARIATRIC SURGERY  Saint Heller 44 y.o. female MRN: [id number]  Unit/Bed#:  Encounter: [id number]      HPI:  Saint Heller is a 44 y.o. female status post robotic sleeve gastrectomy performed by Dr. Reggie Pritchett on  returning to office for first post op visit since surgery. Tolerating diet. Denies nausea and vomiting. Taking multivitamins and PPI daily. Administering lovenox injections. Review of Systems   All other systems reviewed and are negative.       Historical Information   Past Medical History:   Diagnosis Date   • Colon polyp    • CPAP (continuous positive airway pressure) dependence    • GERD (gastroesophageal reflux disease)    • Glaucoma    • Hyperlipidemia    • PCOS (polycystic ovarian syndrome)    • Seasonal allergies    • Sleep apnea      Past Surgical History:   Procedure Laterality Date   •  SECTION     • COLONOSCOPY     • DILATION AND CURETTAGE OF UTERUS      x11   • EGD      2023   • PARAESOPHAGEAL HERNIA REPAIR N/A 2023    Procedure: REPAIR HERNIA PARAESOPHAGEAL LAPAROSCOPIC W ROBOTICS;  Surgeon: Kathryn Merrill MD;  Location: [facility];  Service: Bariatrics   • MI LAPS [address] RSTRICTIV [address] [place] [address] LONGITUDINAL GASTRECTOMY N/A 2023    Procedure: ROBOTIC SLEEVE; INTRAOP EGD;  Surgeon: Kathryn Merrill MD;  Location: [facility];  Service: Bariatrics   • WISDOM TOOTH EXTRACTION       Social History   Social History     Substance and Sexual Activity   Alcohol Use Yes   • Alcohol/week: 4.0 standard drinks of alcohol   • Types: 4 Cans of beer per week    Comment: SOCIAL     Social History     Substance and Sexual Activity   Drug Use Never     Social History     Tobacco Use   Smoking Status Former   • Types: Cigarettes   • Quit date: 3/16/2023   • Years since quittin.5   Smokeless Tobacco Never   Tobacco Comments    SOCIAL ON AND OFF FEW YRS     Family History: non-contributory    Meds/Allergies   all medications and allergies reviewed  Allergies Allergen Reactions   • Doxycycline GI Intolerance       Objective       Current Vitals:   Blood Pressure: 128/87 (10/05/23 1148)  Pulse: 82 (10/05/23 1148)  Temperature: 97.6 °F (36.4 °C) (10/05/23 1148)  Temp Source: Tympanic (10/05/23 1148)  Height: 5' 4.5" (163.8 cm) (10/05/23 1148)  Weight - Scale: 95 kg (209 lb 8 oz) (10/05/23 1148)      Invasive Devices     Peripheral Intravenous Line  Duration           Peripheral IV 09/26/23 Dorsal (posterior); Right Hand 9 days                Physical Exam  Constitutional:       Appearance: Normal appearance. HENT:      Head: Normocephalic. Cardiovascular:      Rate and Rhythm: Normal rate and regular rhythm. Pulses: Normal pulses. Pulmonary:      Effort: Pulmonary effort is normal.   Abdominal:      Palpations: Abdomen is soft. Comments: Incisions clean dry and intact   Skin:     General: Skin is warm. Neurological:      General: No focal deficit present. Mental Status: She is alert and oriented to person, place, and time. Assessment/PLAN:    44 y.o. female status post robotic sleeve gastrectomy done on 9/26 by Dr. Donal Baron, doing well post op. No major issues and healing well. The pathology report was reviewed with the patient and the results were within normal limits. Pathology, and Other Studies: I have personally reviewed pertinent reports. Lab Results   Component Value Date    FINALDX  09/26/2023     A. Portion of Stomach, Sleeve Gastrectomy:  - Segment of full-thickness stomach with no pathologic abnormality.  - No Helicobacter pylori is identified on H&E stained slide.    - Negative for intestinal metaplasia, dysplasia or carcinoma. B. Hernia Sac, Abdominal/Paraesophageal, Repair:  - Fragment of benign fibroadipose and vascular tissue, compatible with hernia sac. Increase physical activity slowly as tolerated and instructed.   Advance diet as instructed by our dietitians today and as indicated in the jina. Continue Lovenox prophylaxis until completed. Continue PPI    Follow up in one month as scheduled.     Ade Moran MD  Bariatric Surgery   10/5/2023  12:08 PM      .

## 2023-10-05 NOTE — PROGRESS NOTES
Weight Management Nutrition Class     Diagnosis: Morbid Obesity    Bariatric Surgeon: Dr. Ja Cloby    Surgery: Vertical Sleeve Gastrectomy    Class: first post op note    Topics discussed today include:     fluid goals post op, protein goals post op, constipation, chew food well, exercise, PPI use, diet progression, hypoglycemia, dumping syndrome, protein supplems, vitamin/mineral supplements, calcium supplements, additional vitamin B12, iron supplements and fat soluble vitamins     Patient was able to verbalize basic diet (protein, fluid, vitamin and mineral) recommendations and possible nutrition-related complications.  Yes

## 2023-10-31 ENCOUNTER — OFFICE VISIT (OUTPATIENT)
Dept: BARIATRICS | Facility: CLINIC | Age: 43
End: 2023-10-31

## 2023-10-31 VITALS — HEIGHT: 65 IN | BODY MASS INDEX: 33.32 KG/M2 | WEIGHT: 200 LBS

## 2023-10-31 DIAGNOSIS — E66.9 OBESITY, CLASS I, BMI 30-34.9: Primary | ICD-10-CM

## 2023-10-31 PROCEDURE — RECHECK

## 2023-10-31 NOTE — PROGRESS NOTES
Weight Management Nutrition Class     Diagnosis: Morbid Obesity    Bariatric Surgeon: Dr. Jacobo Sy    Surgery: Vertical Sleeve Gastrectomy    Class: 5 week post op     Topics discussed today include:     fluid goals post op, protein goals post op, constipation, chew food well, exercise, avoidance of alcohol, PPI use, diet progression, hypoglycemia, dumping syndrome, protein supplems, vitamin/mineral supplements, calcium supplements, and iron supplements    Patient was able to verbalize basic diet (protein, fluid, vitamin and mineral) recommendations and possible nutrition-related complications.  Yes

## 2023-12-21 ENCOUNTER — OFFICE VISIT (OUTPATIENT)
Dept: BARIATRICS | Facility: CLINIC | Age: 43
End: 2023-12-21

## 2023-12-21 VITALS
DIASTOLIC BLOOD PRESSURE: 69 MMHG | BODY MASS INDEX: 31.07 KG/M2 | HEART RATE: 97 BPM | WEIGHT: 186.5 LBS | SYSTOLIC BLOOD PRESSURE: 118 MMHG | HEIGHT: 65 IN

## 2023-12-21 DIAGNOSIS — Z48.815 ENCOUNTER FOR SURGICAL AFTERCARE FOLLOWING SURGERY OF DIGESTIVE SYSTEM: Primary | ICD-10-CM

## 2023-12-21 DIAGNOSIS — K91.2 POSTSURGICAL MALABSORPTION: ICD-10-CM

## 2023-12-21 DIAGNOSIS — Z98.84 BARIATRIC SURGERY STATUS: ICD-10-CM

## 2023-12-21 DIAGNOSIS — E66.9 OBESITY, CLASS I, BMI 30-34.9: ICD-10-CM

## 2023-12-21 RX ORDER — ACETAMINOPHEN 160 MG/5ML
LIQUID ORAL
COMMUNITY
Start: 2023-09-27

## 2023-12-21 RX ORDER — OMEPRAZOLE 20 MG/1
20 CAPSULE, DELAYED RELEASE ORAL DAILY
Qty: 90 CAPSULE | Refills: 0 | Status: SHIPPED | OUTPATIENT
Start: 2023-12-21

## 2023-12-21 NOTE — PATIENT INSTRUCTIONS
Follow-up in 3 months. We kindly ask that your arrive 15 minutes before your scheduled appointment time with your provider to allow our staff to room you, get your vital signs and update your chart.    Get lab work done. Please call the office if you need a script.  It is recommended to check with your insurance BEFORE getting labs done to make sure they are covered by your policy.      Call our office if you have any problems with abdominal pain especially associated with fever, chills, nausea, vomiting or any other concerns.    All  Post-bariatric surgery patients should be aware that very small quantities of any alcohol can cause impairment and it is very possible not to feel the effect. The effect can be in the system for several hours.  It is also a stomach irritant.     It is advised to AVOID alcohol, Nonsteroidal antiinflammatory drugs (NSAIDS) and nicotine of all forms . Any of these can cause stomach irritation/pain.    Discussed the effects of alcohol on a bariatric patient and the increased impairment risk.     Keep up the good work!

## 2023-12-21 NOTE — PROGRESS NOTES
Assessment/Plan:     Patient ID: Wen Fuller is a 43 y.o. female.     Bariatric Surgery Status    -s/p Vertical Sleeve Gastrectomy with Dr. Carlton Gibbs on 9/23/2023. Presents to the office today for 2nd postop doing well overall.  Continue daily PPI     Continued/Maintain healthy weight loss with good nutrition intakes.  Adequate hydration with at least 64oz. fluid intake.  Follow diet as discussed.  Follow vitamin and mineral recommendations as reviewed with you.  Exercise as tolerated.    Colonoscopy referral made: n/a  Mammo: utd    Follow-up in 3 months. We kindly ask that your arrive 15 minutes before your scheduled appointment time with your provider to allow our staff to room you, get your vital signs and update your chart.    Get lab work done. Please call the office if you need a script.  It is recommended to check with your insurance BEFORE getting labs done to make sure they are covered by your policy.      Call our office if you have any problems with abdominal pain especially associated with fever, chills, nausea, vomiting or any other concerns.    All  Post-bariatric surgery patients should be aware that very small quantities of any alcohol can cause impairment and it is very possible not to feel the effect. The effect can be in the system for several hours.  It is also a stomach irritant.     It is advised to AVOID alcohol, Nonsteroidal antiinflammatory drugs (NSAIDS) and nicotine of all forms . Any of these can cause stomach irritation/pain.    Discussed the effects of alcohol on a bariatric patient and the increased impairment risk.     Keep up the good work!     Postsurgical Malabsorption   -At risk for malabsorption of vitamins/minerals secondary to malabsorption and restriction of intake from bariatric surgery  -Currently taking adequate postop bariatric surgery vitamin supplementation  -Next set of bariatric labs ordered for approximately 2 weeks  -Patient received education about the  importance of adhering to a lifelong supplementation regimen to avoid vitamin/mineral deficiencies      Diagnoses and all orders for this visit:    Encounter for surgical aftercare following surgery of digestive system  -     Zinc; Future  -     Vitamin D 25 hydroxy; Future  -     Vitamin B12; Future  -     Vitamin B1, whole blood; Future  -     Vitamin A; Future  -     PTH, intact; Future  -     CBC and Platelet; Future  -     Comprehensive metabolic panel; Future  -     Ferritin; Future  -     Folate; Future  -     TIBC Panel (incl. Iron, TIBC, % Iron Saturation); Future    Bariatric surgery status  -     Zinc; Future  -     Vitamin D 25 hydroxy; Future  -     Vitamin B12; Future  -     Vitamin B1, whole blood; Future  -     Vitamin A; Future  -     PTH, intact; Future  -     CBC and Platelet; Future  -     Comprehensive metabolic panel; Future  -     Ferritin; Future  -     Folate; Future  -     TIBC Panel (incl. Iron, TIBC, % Iron Saturation); Future  -     omeprazole (PriLOSEC) 20 mg delayed release capsule; Take 1 capsule (20 mg total) by mouth daily    Postsurgical malabsorption  -     Zinc; Future  -     Vitamin D 25 hydroxy; Future  -     Vitamin B12; Future  -     Vitamin B1, whole blood; Future  -     Vitamin A; Future  -     PTH, intact; Future  -     CBC and Platelet; Future  -     Comprehensive metabolic panel; Future  -     Ferritin; Future  -     Folate; Future  -     TIBC Panel (incl. Iron, TIBC, % Iron Saturation); Future    Obesity, Class I, BMI 30-34.9  -     Zinc; Future  -     Vitamin D 25 hydroxy; Future  -     Vitamin B12; Future  -     Vitamin B1, whole blood; Future  -     Vitamin A; Future  -     PTH, intact; Future  -     CBC and Platelet; Future  -     Comprehensive metabolic panel; Future  -     Ferritin; Future  -     Folate; Future  -     TIBC Panel (incl. Iron, TIBC, % Iron Saturation); Future    Other orders  -     M- MG/5ML liquid;  (Patient not taking: Reported on  "12/21/2023)         Subjective:      Patient ID: Wen Fuller is a 43 y.o. female.    -s/p Vertical Sleeve Gastrectomy with Dr. Carlton Gibbs on 9/23/2023. Presents to the office today for 2nd postop doing well overall. Tolerating diet without issues; denies N/V, dysphagia, reflux.     Initial:218  Current:186  EWL: (Weight loss is ahead of schedule at this post surgical period.)  Talat: current   Current BMI is Body mass index is 31.52 kg/m².    Tolerating a regular diet-yes  Eating at least 60 grams of protein per day-yes  Following 30/60 minute rule with liquids-yes  Drinking at least 64 ounces of fluid per day-yes  Drinking carbonated beverages-no  Sufficient exercise-walking   Using NSAIDs regularly-no  Using nicotine-no  Using alcohol-no  Supplements:  barilife mvi + calcium chews     EWL is 40%, which places the patient ahead of schedule for expected post surgical weight loss at this time.     The following portions of the patient's history were reviewed and updated as appropriate: allergies, current medications, past family history, past medical history, past social history, past surgical history and problem list.    Review of Systems   Constitutional: Negative.    Respiratory: Negative.     Cardiovascular: Negative.    Gastrointestinal:  Positive for constipation (occasional , uses miraalax). Negative for abdominal pain, blood in stool, diarrhea, nausea and vomiting.   Neurological: Negative.    Psychiatric/Behavioral: Negative.           Objective:    /69 (BP Location: Left arm, Patient Position: Sitting, Cuff Size: Standard)   Pulse 97   Ht 5' 4.5\" (1.638 m)   Wt 84.6 kg (186 lb 8 oz)   BMI 31.52 kg/m²      Physical Exam  Vitals and nursing note reviewed.   Constitutional:       Appearance: Normal appearance. She is obese.   HENT:      Head: Normocephalic and atraumatic.   Eyes:      Extraocular Movements: Extraocular movements intact.      Pupils: Pupils are equal, round, and reactive to light. "   Cardiovascular:      Rate and Rhythm: Normal rate.   Pulmonary:      Effort: Pulmonary effort is normal.   Musculoskeletal:         General: Normal range of motion.      Cervical back: Normal range of motion.   Skin:     General: Skin is warm and dry.   Neurological:      General: No focal deficit present.      Mental Status: She is alert and oriented to person, place, and time.   Psychiatric:         Mood and Affect: Mood normal.

## 2024-03-19 ENCOUNTER — OFFICE VISIT (OUTPATIENT)
Dept: BARIATRICS | Facility: CLINIC | Age: 44
End: 2024-03-19
Payer: COMMERCIAL

## 2024-03-19 VITALS
DIASTOLIC BLOOD PRESSURE: 70 MMHG | SYSTOLIC BLOOD PRESSURE: 116 MMHG | BODY MASS INDEX: 28.49 KG/M2 | WEIGHT: 171 LBS | TEMPERATURE: 97 F | HEIGHT: 65 IN | HEART RATE: 76 BPM

## 2024-03-19 DIAGNOSIS — K91.2 POSTSURGICAL MALABSORPTION: ICD-10-CM

## 2024-03-19 DIAGNOSIS — Z48.815 ENCOUNTER FOR SURGICAL AFTERCARE FOLLOWING SURGERY OF DIGESTIVE SYSTEM: Primary | ICD-10-CM

## 2024-03-19 DIAGNOSIS — Z98.84 BARIATRIC SURGERY STATUS: ICD-10-CM

## 2024-03-19 PROCEDURE — 99213 OFFICE O/P EST LOW 20 MIN: CPT | Performed by: NURSE PRACTITIONER

## 2024-03-19 NOTE — PROGRESS NOTES
Assessment/Plan:     Patient ID: Wen Fuller is a 43 y.o. female.     Bariatric Surgery Status/BMI 28  -s/p Vertical Sleeve Gastrectomy with Dr. Carlton Gibbs on 9/23/2023. Presents to the office today for 3rd postop doing well overall well. Tolerating a regular diet. Denies having any abdominal pain, N/V/D/C, regurgitation, reflux or dysphagia. Taking her MVI and PPI daily.  May at times have heartburn however unsure of what is triggering her symptoms. Concern as she has reached a stall in the past 3 weeks.  Currently does not track her caloric intake.      PLAN:    - taper off omeprazole over the next 6 weeks.  Recommend to do a food diary or evaluate foods that she is eating that may be triggering her symptoms.  Can use Tums or Pepcid if she experiences heartburn.  She will call our office if she is unable to wean off omeprazole  -Recommended to track her caloric intake.  May be not eating enough.  Follow-up with RD as needed.  - Routine follow up in 6 months for annual visit.   - Continue with healthy lifestyle, adequate protein intake of 60 gm, fluid intake of at least 64 oz.   - Continue with MVI daily.   - Activity as tolerated.   - Labs ordered and will adjust accordingly if any deficiency.   - Follow up with RD and SW as needed.     Continued/Maintain healthy weight loss with good nutrition intakes.  Adequate hydration with at least 64oz. fluid intake.  Follow diet as discussed.  Follow vitamin and mineral recommendations as reviewed with you.  Exercise as tolerated.    Colonoscopy referral made: N/A  Mammogram - UTD     Follow-up in 6 months for annual visit. We kindly ask that your arrive 15 minutes before your scheduled appointment time with your provider to allow our staff to room you, get your vital signs and update your chart.    Get lab work done prior to annual visit. Please call the office if you need a script.  It is recommended to check with your insurance BEFORE getting labs done to make sure  they are covered by your policy.      Call our office if you have any problems with abdominal pain especially associated with fever, chills, nausea, vomiting or any other concerns.    All  Post-bariatric surgery patients should be aware that very small quantities of any alcohol can cause impairment and it is very possible not to feel the effect. The effect can be in the system for several hours.  It is also a stomach irritant.     It is advised to AVOID alcohol, Nonsteroidal antiinflammatory drugs (NSAIDS) and nicotine of all forms . Any of these can cause stomach irritation/pain.    Discussed the effects of alcohol on a bariatric patient and the increased impairment risk.     Keep up the good work!     Postsurgical Malabsorption   -At risk for malabsorption of vitamins/minerals secondary to malabsorption and restriction of intake from bariatric surgery  -Currently taking adequate postop bariatric surgery vitamin supplementation  -Last set of bariatric labs completed on 12/27/2023 and showed WNL  -Patient received education about the importance of adhering to a lifelong supplementation regimen to avoid vitamin/mineral deficiencies      Diagnoses and all orders for this visit:    Encounter for surgical aftercare following surgery of digestive system    Bariatric surgery status    Postsurgical malabsorption    BMI 28.0-28.9,adult         Subjective:      Patient ID: Wen Fuller is a 43 y.o. female.  -s/p Vertical Sleeve Gastrectomy with Dr. Carlton Gibbs on 9/23/2023. Presents to the office today for 3rd postop doing well overall well. Tolerating a regular diet. Denies having any abdominal pain, N/V/D/C, regurgitation, reflux or dysphagia. Taking her MVI and PPI daily.  May at times have heartburn however unsure of what is triggering her symptoms. Concern as she has reached a stall in the past 3 weeks.  Currently does not track her caloric intake.    Initial: 218 lbs  Current: 171 lbs  EWL: (Weight loss is ahead of  "schedule at this post surgical period.)  Talat: current  Current BMI is Body mass index is 28.9 kg/m².    Tolerating a regular diet-yes  Eating at least 60 grams of protein per day-yes  Following 30/60 minute rule with liquids-yes  Drinking at least 64 ounces of fluid per day-yes  Drinking carbonated beverages-no  Sufficient exercise-no -   Using NSAIDs regularly-no  Using nicotine-no  Using alcohol-no  Supplements:  bariatric MVI + iron 45 mg + calcium TID    EWL is 63%, which places the patient ahead of schedule for expected post surgical weight loss at this time.     The following portions of the patient's history were reviewed and updated as appropriate: allergies, current medications, past family history, past medical history, past social history, past surgical history and problem list.    Review of Systems   Constitutional: Negative.    Respiratory: Negative.     Cardiovascular: Negative.    Gastrointestinal: Negative.    Musculoskeletal: Negative.    Neurological: Negative.    Psychiatric/Behavioral: Negative.           Objective:    /70   Pulse 76   Temp (!) 97 °F (36.1 °C) (Tympanic)   Ht 5' 4.5\" (1.638 m)   Wt 77.6 kg (171 lb)   BMI 28.90 kg/m²      Physical Exam  Vitals and nursing note reviewed.   Constitutional:       Appearance: Normal appearance.   Cardiovascular:      Rate and Rhythm: Normal rate and regular rhythm.      Pulses: Normal pulses.      Heart sounds: Normal heart sounds.   Pulmonary:      Effort: Pulmonary effort is normal.      Breath sounds: Normal breath sounds.   Abdominal:      General: Bowel sounds are normal.      Palpations: Abdomen is soft.      Tenderness: There is no abdominal tenderness.   Musculoskeletal:         General: Normal range of motion.   Skin:     General: Skin is warm and dry.   Neurological:      General: No focal deficit present.      Mental Status: She is alert and oriented to person, place, and time.   Psychiatric:         Mood and Affect: Mood " normal.         Behavior: Behavior normal.         Thought Content: Thought content normal.         Judgment: Judgment normal.

## 2024-03-28 ENCOUNTER — TELEPHONE (OUTPATIENT)
Dept: SLEEP CENTER | Facility: CLINIC | Age: 44
End: 2024-03-28

## 2024-03-28 NOTE — TELEPHONE ENCOUNTER
Patient left message stating she'd like an order to discontinue CPAP sent to [facility].  When she was set up in June, Dorinda Multani, the rep in [place], told her she'd have to make 4 payments then she'd own the machine.  She continues to get billed and she was recently contacted by her insurance and told that an authorization was requested for an additional 10 months of charges.   Patient feels she shouldn't have to pay another 10 months on the machine when she was told it would be paid off in 4 months.    Returned call and spoke to patient.  She is very frustrated with [employer].  States she called their billing department twice and no one could explain why she needed to continue to pay for 10 more months.  She also emailed Dorinda Multani last week and did not receive a response.  She feels her insurance is not being billed correctly.   She would like to continue using the CPAP if possible but not if she has to pay for 10 more months.     Advised patient I would send an email to [facility] management and ask that they call her to assist with this. Patient agreeable.     Email sent to Any Estrella, Shana High and Roberto Zimmerman asking that someone contact the patient as soon as possible to discuss the billing issue.

## 2024-04-11 DIAGNOSIS — G47.33 OSA (OBSTRUCTIVE SLEEP APNEA): Primary | ICD-10-CM

## 2024-04-11 LAB
DME PARACHUTE DELIVERY DATE REQUESTED: NORMAL
DME PARACHUTE ITEM DESCRIPTION: NORMAL
DME PARACHUTE ORDER STATUS: NORMAL
DME PARACHUTE SUPPLIER NAME: NORMAL
DME PARACHUTE SUPPLIER PHONE: NORMAL

## 2024-04-17 LAB
DME PARACHUTE DELIVERY DATE ACTUAL: NORMAL
DME PARACHUTE DELIVERY DATE REQUESTED: NORMAL
DME PARACHUTE ITEM DESCRIPTION: NORMAL
DME PARACHUTE ORDER STATUS: NORMAL
DME PARACHUTE SUPPLIER NAME: NORMAL
DME PARACHUTE SUPPLIER PHONE: NORMAL

## 2024-05-22 ENCOUNTER — PATIENT MESSAGE (OUTPATIENT)
Dept: BARIATRICS | Facility: CLINIC | Age: 44
End: 2024-05-22

## 2024-05-22 NOTE — PATIENT COMMUNICATION
Received following email from patient :    Good Morning,  I'm reaching out to see what I should do to get my weight loss started again.  I have been in a very slow loss pattern for the last month.  I've only lost about 4 lbs in the last 4 weeks.  Should I up my protein intake? How do I break the stall?    Wen Fuller      Responded with the following :  Manjinder Carver    It is common for weight loss to slow down after 6 months, as you are getting closer to your maintenance weight.  Most patient will level out at their maintenance weight at around 12 months post op At this stage, you should be eating 1000 to 1200 calories per day .  Undereating can slow down  your metabolism and then slow down weight loss Once you get past 6 months - exercise becomes more important for weight loss and weight maintenance.   The goal is moderate activity of 150 minutes per week or vigorous activity of 75 minutes per week     I've attached some handout for your reference.     Take care

## 2024-09-19 ENCOUNTER — OFFICE VISIT (OUTPATIENT)
Dept: BARIATRICS | Facility: CLINIC | Age: 44
End: 2024-09-19
Payer: COMMERCIAL

## 2024-09-19 ENCOUNTER — CLINICAL SUPPORT (OUTPATIENT)
Dept: BARIATRICS | Facility: CLINIC | Age: 44
End: 2024-09-19

## 2024-09-19 VITALS
BODY MASS INDEX: 25.66 KG/M2 | SYSTOLIC BLOOD PRESSURE: 118 MMHG | DIASTOLIC BLOOD PRESSURE: 80 MMHG | HEIGHT: 65 IN | WEIGHT: 154 LBS | TEMPERATURE: 98.4 F | OXYGEN SATURATION: 99 % | HEART RATE: 69 BPM

## 2024-09-19 VITALS — WEIGHT: 154 LBS | BODY MASS INDEX: 26.03 KG/M2

## 2024-09-19 DIAGNOSIS — K91.2 POSTSURGICAL MALABSORPTION: ICD-10-CM

## 2024-09-19 DIAGNOSIS — E66.9 OBESITY, CLASS II, BMI 35-39.9: Primary | ICD-10-CM

## 2024-09-19 DIAGNOSIS — K21.9 GERD (GASTROESOPHAGEAL REFLUX DISEASE): ICD-10-CM

## 2024-09-19 DIAGNOSIS — Z48.815 ENCOUNTER FOR SURGICAL AFTERCARE FOLLOWING SURGERY OF DIGESTIVE SYSTEM: Primary | ICD-10-CM

## 2024-09-19 DIAGNOSIS — Z98.84 BARIATRIC SURGERY STATUS: ICD-10-CM

## 2024-09-19 PROCEDURE — 99214 OFFICE O/P EST MOD 30 MIN: CPT | Performed by: NURSE PRACTITIONER

## 2024-09-19 PROCEDURE — RECHECK: Performed by: DIETITIAN, REGISTERED

## 2024-09-19 RX ORDER — BIOTIN 1000 MCG
TABLET,CHEWABLE ORAL DAILY
COMMUNITY

## 2024-09-19 RX ORDER — DIPHENOXYLATE HYDROCHLORIDE AND ATROPINE SULFATE 2.5; .025 MG/1; MG/1
1 TABLET ORAL DAILY
COMMUNITY

## 2024-09-19 NOTE — PROGRESS NOTES
Assessment/Plan:     Patient ID: Wen Fuller is a 44 y.o. female.     Bariatric Surgery Status/BMI 26/GERD  -s/p Vertical Sleeve Gastrectomy with Dr. Carlton Gibbs on 9/23/2023. Presents to the office today for annual visit.  Patient frustrated she has not lost any weight in the past 2 months.  She has seen a stall.  Currently does not track her caloric intake.  Activity has decreased during the summer.  She tolerates a regular diet. Denies having any abdominal pain, N/V/D/C, regurgitation, reflux or dysphagia.  Taking her multivitamins daily.  Was able to stop her omeprazole use without any difficulty.    PLAN:     -Recommend to track her caloric intake.  She should reach around 1200 jeffy at this point.  Also recommended to increase her activity.  Follow-up with our RD for postop support  - Routine follow up in 6 months for 18-month postop visit  - Continue with healthy lifestyle, adequate protein intake of 60 gm, fluid intake of at least 64 oz.   - Continue with MVI daily.   - Activity as tolerated.   - Labs ordered and will adjust accordingly if any deficiency.   - Follow up with RD and SW as needed.           Continued/Maintain healthy weight loss with good nutrition intakes.  Adequate hydration with at least 64oz. fluid intake.  Follow diet as discussed.  Follow vitamin and mineral recommendations as reviewed with you.  Exercise as tolerated.    Colonoscopy referral made: n/a  Mammogram - DUE - she will be getting this done through PCP.     Follow-up in 6 months for 18 month post op visit. . We kindly ask that your arrive 15 minutes before your scheduled appointment time with your provider to allow our staff to room you, get your vital signs and update your chart.    Get lab work done prior to visit. Please call the office if you need a script.  It is recommended to check with your insurance BEFORE getting labs done to make sure they are covered by your policy.      Call our office if you have any problems with  abdominal pain especially associated with fever, chills, nausea, vomiting or any other concerns.    All  Post-bariatric surgery patients should be aware that very small quantities of any alcohol can cause impairment and it is very possible not to feel the effect. The effect can be in the system for several hours.  It is also a stomach irritant.     It is advised to AVOID alcohol, Nonsteroidal antiinflammatory drugs (NSAIDS) and nicotine of all forms . Any of these can cause stomach irritation/pain.    Discussed the effects of alcohol on a bariatric patient and the increased impairment risk.     Keep up the good work!     Postsurgical Malabsorption   -At risk for malabsorption of vitamins/minerals secondary to malabsorption and restriction of intake from bariatric surgery  -Currently taking adequate postop bariatric surgery vitamin supplementation  -Last set of bariatric labs completed on 12/2023 and showed WNL  -Next set of bariatric labs ordered for approximately 2 weeks  -Patient received education about the importance of adhering to a lifelong supplementation regimen to avoid vitamin/mineral deficiencies      Diagnoses and all orders for this visit:    Encounter for surgical aftercare following surgery of digestive system  -     CBC; Future  -     Comprehensive metabolic panel; Future  -     Folate; Future  -     Iron Panel (Includes Ferritin, Iron Sat%, Iron, and TIBC); Future  -     PTH, intact; Future  -     Vitamin B1, whole blood; Future  -     Vitamin A; Future  -     Vitamin B12; Future  -     Vitamin D 25 hydroxy; Future  -     Zinc; Future    Bariatric surgery status  -     CBC; Future  -     Comprehensive metabolic panel; Future  -     Folate; Future  -     Iron Panel (Includes Ferritin, Iron Sat%, Iron, and TIBC); Future  -     PTH, intact; Future  -     Vitamin B1, whole blood; Future  -     Vitamin A; Future  -     Vitamin B12; Future  -     Vitamin D 25 hydroxy; Future  -     Zinc;  Future    Postsurgical malabsorption  -     CBC; Future  -     Comprehensive metabolic panel; Future  -     Folate; Future  -     Iron Panel (Includes Ferritin, Iron Sat%, Iron, and TIBC); Future  -     PTH, intact; Future  -     Vitamin B1, whole blood; Future  -     Vitamin A; Future  -     Vitamin B12; Future  -     Vitamin D 25 hydroxy; Future  -     Zinc; Future    BMI 26.0-26.9,adult  -     CBC; Future  -     Comprehensive metabolic panel; Future  -     Folate; Future  -     Iron Panel (Includes Ferritin, Iron Sat%, Iron, and TIBC); Future  -     PTH, intact; Future  -     Vitamin B1, whole blood; Future  -     Vitamin A; Future  -     Vitamin B12; Future  -     Vitamin D 25 hydroxy; Future  -     Zinc; Future    GERD (gastroesophageal reflux disease)  -     CBC; Future  -     Comprehensive metabolic panel; Future  -     Folate; Future  -     Iron Panel (Includes Ferritin, Iron Sat%, Iron, and TIBC); Future  -     PTH, intact; Future  -     Vitamin B1, whole blood; Future  -     Vitamin A; Future  -     Vitamin B12; Future  -     Vitamin D 25 hydroxy; Future  -     Zinc; Future    Other orders  -     Biotin 1000 MCG CHEW; Chew in the morning  -     multivitamin (THERAGRAN) TABS; Take 1 tablet by mouth daily         Subjective:      Patient ID: Wen Fuller is a 44 y.o. female.    -s/p Vertical Sleeve Gastrectomy with Dr. Carlton Gibbs on 9/23/2023. Presents to the office today for annual visit.  Patient frustrated she has not lost any weight in the past 2 months.  She has seen a stall.  Currently does not track her caloric intake.  Activity has decreased during the summer.  She tolerates a regular diet. Denies having any abdominal pain, N/V/D/C, regurgitation, reflux or dysphagia.  Taking her multivitamins daily.  Was able to stop her omeprazole use without any difficulty.    Initial: 218 lbs   Current: 154 lbs  EWL: (Weight loss is ahead of schedule at this post surgical period.)  Talat: current   Current BMI  "is Body mass index is 26.03 kg/m².    Tolerating a regular diet-yes  Eating at least 60 grams of protein per day-yes  Following 30/60 minute rule with liquids-yes  Drinking at least 64 ounces of fluid per day-yes  Drinking carbonated beverages-no  Sufficient exercise-no - encouraged  to increase this.   Using NSAIDs regularly-no  Using nicotine-no  Using alcohol-rarely   Supplements: bariatric MVI + iron 45 mg + calcium TID + biotin     EWL is 89%, which places the patient ahead of schedule for expected post surgical weight loss at this time.     The following portions of the patient's history were reviewed and updated as appropriate: allergies, current medications, past family history, past medical history, past social history, past surgical history and problem list.    Review of Systems   Constitutional:  Positive for unexpected weight change.   Respiratory: Negative.     Cardiovascular: Negative.    Gastrointestinal: Negative.    Musculoskeletal: Negative.    Neurological: Negative.    Psychiatric/Behavioral: Negative.           Objective:    /80 (BP Location: Left arm, Patient Position: Sitting, Cuff Size: Adult)   Pulse 69   Temp 98.4 °F (36.9 °C) (Tympanic)   Ht 5' 4.5\" (1.638 m)   Wt 69.9 kg (154 lb)   SpO2 99%   BMI 26.03 kg/m²      Physical Exam  Vitals and nursing note reviewed.   Constitutional:       Appearance: Normal appearance.   Cardiovascular:      Rate and Rhythm: Normal rate and regular rhythm.      Pulses: Normal pulses.      Heart sounds: Normal heart sounds.   Pulmonary:      Effort: Pulmonary effort is normal.      Breath sounds: Normal breath sounds.   Abdominal:      General: Bowel sounds are normal.      Palpations: Abdomen is soft.      Tenderness: There is no abdominal tenderness.   Musculoskeletal:         General: Normal range of motion.   Skin:     General: Skin is warm and dry.   Neurological:      General: No focal deficit present.      Mental Status: She is alert and " oriented to person, place, and time.   Psychiatric:         Mood and Affect: Mood normal.         Behavior: Behavior normal.         Thought Content: Thought content normal.         Judgment: Judgment normal.

## 2024-09-19 NOTE — PROGRESS NOTES
Bariatric Follow Up Nutrition Note    Type of surgery  Vertical sleeve gastrectomy  Surgery Date: 2023  1 years  post-op  Surgeon: Nikos Surgeons: Dr. Carlton Gibbs     Nutrition Assessment   Wen Fuller  44 y.o.  female  Wt 69.9 kg (154 lb)   BMI 26.03 kg/m²     Average Estimated energy Needs for 12-18 months post-op:  1400kcal, 75g pro, 40g fat, 172g cho, 64oz fluids    Hartman- St. Jeor Equation:    BMR: 1346kcal  Estimated calories for weight maintenance:  1616kcal  ( sedentary  )   Estimated calories for weight loss 1116kcal (1# per wk wt loss - sedentary )  Estimated protein needs 67-101g (1.0-1.5 gms/kg IBW )   Estimated fluid needs 2010-2345ml (30-35 ml/kg IBW )      3/22/2023 Initial Surgery Evaluation: 214.3lbs  2023 Weight on Day of Weight Loss Surgery: 218.5lbs  Weight in (lb) to have BMI = 25: 148lbs  Pre-Op Excess Wt: 70.5lbs  Post-op Talat= current weight  Post-Op Wt Loss: 64.5#/ 91.5% EBWL/ 29.5 % TBWL  in 1 year(s)    Review of History and Medications   Past Medical History:   Diagnosis Date    Anxiety     Colon polyp     CPAP (continuous positive airway pressure) dependence     Depression -    GERD (gastroesophageal reflux disease)     Glaucoma     Hyperlipidemia     Hypertension     PCOS (polycystic ovarian syndrome)     Seasonal allergies     Sleep apnea      Past Surgical History:   Procedure Laterality Date     SECTION      COLONOSCOPY      DILATION AND CURETTAGE OF UTERUS      x11    EGD      2023    PARAESOPHAGEAL HERNIA REPAIR N/A 2023    Procedure: REPAIR HERNIA PARAESOPHAGEAL LAPAROSCOPIC W ROBOTICS;  Surgeon: Meliton Gibbs MD;  Location: [facility];  Service: Bariatrics    NM LAPS GSTRC RSTRICTIV PX LONGITUDINAL GASTRECTOMY N/A 2023    Procedure: ROBOTIC SLEEVE; INTRAOP EGD;  Surgeon: Meliton Gibbs MD;  Location: [facility];  Service: Bariatrics    WISDOM TOOTH EXTRACTION       Social History     Socioeconomic History    Marital status:  /Civil Union     Spouse name: Not on file    Number of children: Not on file    Years of education: Not on file    Highest education level: Not on file   Occupational History    Not on file   Tobacco Use    Smoking status: Former     Current packs/day: 0.00     Types: Cigarettes     Quit date: 3/16/2023     Years since quittin.5     Passive exposure: Past    Smokeless tobacco: Never    Tobacco comments:     SOCIAL ON AND OFF FEW YRS   Vaping Use    Vaping status: Former   Substance and Sexual Activity    Alcohol use: Yes     Alcohol/week: 6.0 standard drinks of alcohol     Types: 6 Cans of beer per week     Comment: RARE    Drug use: Never    Sexual activity: Yes     Partners: Male     Comment: defer   Other Topics Concern    Not on file   Social History Narrative    Not on file     Social Determinants of Health     Financial Resource Strain: Patient Declined (2024)    [facility]    Overall Financial Resource Strain (CARDIA)     Difficulty of Paying Living Expenses: Patient declined   Food Insecurity: Patient Declined (2024)    [facility]    Hunger Vital Sign     Worried About Running Out of Food in the Last Year: Patient declined     Ran Out of Food in the Last Year: Patient declined   Transportation Needs: No Transportation Needs (2024)    [facility]    PRAPARE - Transportation     Lack of Transportation (Medical): No     Lack of Transportation (Non-Medical): No   Physical Activity: Inactive (2022)    Received from [facility]    Exercise Vital Sign     Days of Exercise per Week: 0 days     Minutes of Exercise per Session: 0 min   Stress: Patient Declined (2024)    [facility] from [facility]    [employer] -  Occupational Stress Questionnaire     Feeling of Stress : Patient declined   Social Connections: Unknown (1/2/2024)    Received from [facility], [facility]    OASIS : Social Isolation     How often do you feel lonely or isolated from those around you?: Patient declines to respond   Intimate Partner Violence: Patient Declined (1/2/2024)    Received from [facility], [facility]    Humiliation, Afraid, Rape, and Kick questionnaire     Fear of Current or Ex-Partner: Patient declined     Emotionally Abused: Patient declined     Physically Abused: Patient declined     Sexually Abused: Patient declined   Housing Stability: Low Risk  (2/16/2022)    Received from [facility], [facility]    Housing Stability Vital Sign     Unable to Pay for Housing in the Last Year: No     Number of Places Lived in the Last Year: 1     Unstable Housing in the Last Year: No       Current Outpatient Medications:     Biotin 1000 MCG CHEW, Chew in the morning, Disp: , Rfl:     desoximetasone (TOPICORT) 0.25 % ointment, , Disp: , Rfl:     fexofenadine (ALLEGRA) 180 MG tablet, Take 180 mg by mouth daily, Disp: , Rfl:     fluticasone (FLONASE) 50 mcg/act nasal spray, 2 sprays into each nostril if needed, Disp: , Rfl:     latanoprost (XALATAN) 0.005 % ophthalmic solution, Administer 1 drop to both eyes daily at bedtime, Disp: , Rfl:     multivitamin (THERAGRAN) TABS, Take 1 tablet by mouth daily, Disp: , Rfl:     Food Intake and Lifestyle Assessment   Food Intake Assessment completed via usual diet recall  Wakes 6-6:30  Breakfast: Protein shake-Fair Life 26g  Snack: sometimes does not eat until lunch, some times eats some watermelon   Lunch: varies:  preps food: [employer] cooked chicken (24g pro), nachos or tacos/ytaco meat-beef or turkey  Snack: watermelon if didn't have it in the morning  Dinner: 5-5:30pm:  cook: makes pizza- has small  piece- puts meats on it- chipsteak OR steak and green beans  Snack: occasional popsicle: haagen-romero-most nights  Sometimes second protein drink late at night OR handful cashews  Bed 9pm  Beverage intake: protein drinks, crystal light iced tea  Diet texture/stage: regular  Protein supplement: Fair Life  Estimated protein intake per day: 60-90g  Estimated fluid intake per day: 32oz cup x 1.5 per day iced tea  Meals eaten away from home: rare- cheeseburger without bun or plain cheesesteak meat  Typical meal pattern: 2-3 meals per day and 0-2 snacks per day  Eating Behaviors: Appropriate diet advancement, Appropriate portion sizes, and Does not drink with meals and waits 30-minutes after meal before resuming drinking    Food allergies or intolerances: NKFA  Cultural or Caodaism considerations: none noted    Vitamin and Mineral regimen bariatric MVI + iron 45 mg + calcium TID + biotin     Physical Assessment  Nutrition Related Findings  Return of Hunger    Physical Activity  Types of exercise:   Stopped going to gym when summer hit-too busy.    Sedentary desk job  Current physical limitations: none    Psychosocial Assessment   Support systems: spouse and children  Socioeconomic factors: works from home    Nutrition Diagnosis  Diagnosis: Overweight / Obesity (NC-3.3) and Altered GI function (NC-1.4)  Related to: Physical inactivity and Altered GI function  As Evidenced by: BMI >25 and s/p bariatric surgery     Nutrition Prescription: Recommend the following diet  Low fat, Low sugar, High protein, and Regular  1200-1400kcal, 75g pro    Interventions and Teaching   Patient educated on post-op nutrition guidelines.       Patient educated and handouts provided.  Capacity of post-surgery stomach  Adequate hydration  Expected weight loss  Weight loss plateaus/ possibility of weight regain  Exercise  Nutrition considerations after surgery  Appropriate carbohydrate, protein, and fat intake, and food/fluid choices to maximize  safe weight loss, nutrient intake, and tolerance   Dietary and lifestyle changes  Techniques for self monitoring and keeping daily food journal    Patient is not currently pregnant and doesn't desire to become pregnant a minimum of one year post-op    Education provided to: patient    Barriers to learning: No barriers identified    Readiness to change: action    Comprehension: verbalizes understanding     Expected Compliance: good    Evaluation/Monitoring   Eating pattern as discussed Tolerance of nutrition prescription Body weight Lab values Physical activity    Goals  Exercise 30 minutes 5 times per week and Eat 3 meals per day     Time Spent:   30 Minutes

## 2024-09-19 NOTE — PROGRESS NOTES
Date of surgery: 9/26/2023  Procedure: Sleeve  Performing surgeon: Dr. Carlton Major     Initial Weight - 213.4 lb   Current Weight - 154.0 lb   Talat Weight - 171.0 lb   Total Body Weight Loss (EWL)- 59.5  EWL% - 89%  TWB % - 28%

## 2024-10-15 ENCOUNTER — TELEPHONE (OUTPATIENT)
Age: 44
End: 2024-10-15

## 2024-10-15 ENCOUNTER — TELEPHONE (OUTPATIENT)
Dept: BARIATRICS | Facility: CLINIC | Age: 44
End: 2024-10-15

## 2024-10-15 NOTE — TELEPHONE ENCOUNTER
Attempted to call patient back to discuss her cancelled appointment on 10.02.2024, unable to leave message for call back. Will attempt to call again later.

## 2024-10-15 NOTE — TELEPHONE ENCOUNTER
Patient called in regarding her lab tests that she had done on 10/2/24 at the [facility]. Labs are still showing as active in the patients MyChart but also showing as Not Released. Patient would like to know when she will receive the results as it has been over a week since she has had the labs drawn. Please call the patient back at [phone] to discuss.

## 2024-10-15 NOTE — TELEPHONE ENCOUNTER
Called pt due to note to request her lab results, we informed her she has to call the lab and be asked for them to be sent over, pt understood and will give the lab a call.

## 2024-10-17 ENCOUNTER — APPOINTMENT (OUTPATIENT)
Dept: LAB | Facility: CLINIC | Age: 44
End: 2024-10-17
Payer: COMMERCIAL

## 2024-10-17 DIAGNOSIS — K91.2 POSTSURGICAL MALABSORPTION: ICD-10-CM

## 2024-10-17 DIAGNOSIS — Z98.84 BARIATRIC SURGERY STATUS: ICD-10-CM

## 2024-10-17 DIAGNOSIS — Z48.815 ENCOUNTER FOR SURGICAL AFTERCARE FOLLOWING SURGERY OF DIGESTIVE SYSTEM: ICD-10-CM

## 2024-10-17 DIAGNOSIS — K21.9 GERD (GASTROESOPHAGEAL REFLUX DISEASE): ICD-10-CM

## 2024-10-17 LAB
25(OH)D3 SERPL-MCNC: 59.1 NG/ML (ref 30–100)
ALBUMIN SERPL BCG-MCNC: 4.4 G/DL (ref 3.5–5)
ALP SERPL-CCNC: 54 U/L (ref 34–104)
ALT SERPL W P-5'-P-CCNC: 28 U/L (ref 7–52)
ANION GAP SERPL CALCULATED.3IONS-SCNC: 8 MMOL/L (ref 4–13)
AST SERPL W P-5'-P-CCNC: 21 U/L (ref 13–39)
BILIRUB SERPL-MCNC: 0.73 MG/DL (ref 0.2–1)
BUN SERPL-MCNC: 17 MG/DL (ref 5–25)
CALCIUM SERPL-MCNC: 8.9 MG/DL (ref 8.4–10.2)
CHLORIDE SERPL-SCNC: 104 MMOL/L (ref 96–108)
CO2 SERPL-SCNC: 27 MMOL/L (ref 21–32)
CREAT SERPL-MCNC: 0.5 MG/DL (ref 0.6–1.3)
ERYTHROCYTE [DISTWIDTH] IN BLOOD BY AUTOMATED COUNT: 12.6 % (ref 11.6–15.1)
FERRITIN SERPL-MCNC: 76 NG/ML (ref 11–307)
FOLATE SERPL-MCNC: >22.3 NG/ML
GFR SERPL CREATININE-BSD FRML MDRD: 118 ML/MIN/1.73SQ M
GLUCOSE P FAST SERPL-MCNC: 92 MG/DL (ref 65–99)
HCT VFR BLD AUTO: 39 % (ref 34.8–46.1)
HGB BLD-MCNC: 13.1 G/DL (ref 11.5–15.4)
IRON SATN MFR SERPL: 25 % (ref 15–50)
IRON SERPL-MCNC: 83 UG/DL (ref 50–212)
MCH RBC QN AUTO: 32 PG (ref 26.8–34.3)
MCHC RBC AUTO-ENTMCNC: 33.6 G/DL (ref 31.4–37.4)
MCV RBC AUTO: 95 FL (ref 82–98)
PLATELET # BLD AUTO: 196 THOUSANDS/UL (ref 149–390)
PMV BLD AUTO: 9.1 FL (ref 8.9–12.7)
POTASSIUM SERPL-SCNC: 4.2 MMOL/L (ref 3.5–5.3)
PROT SERPL-MCNC: 6.9 G/DL (ref 6.4–8.4)
PTH-INTACT SERPL-MCNC: 47.6 PG/ML (ref 12–88)
RBC # BLD AUTO: 4.09 MILLION/UL (ref 3.81–5.12)
SODIUM SERPL-SCNC: 139 MMOL/L (ref 135–147)
TIBC SERPL-MCNC: 335 UG/DL (ref 250–450)
UIBC SERPL-MCNC: 252 UG/DL (ref 155–355)
VIT B12 SERPL-MCNC: 891 PG/ML (ref 180–914)
WBC # BLD AUTO: 6.59 THOUSAND/UL (ref 4.31–10.16)

## 2024-10-17 PROCEDURE — 85027 COMPLETE CBC AUTOMATED: CPT

## 2024-10-17 PROCEDURE — 80053 COMPREHEN METABOLIC PANEL: CPT

## 2024-10-17 PROCEDURE — 82306 VITAMIN D 25 HYDROXY: CPT

## 2024-10-17 PROCEDURE — 83540 ASSAY OF IRON: CPT

## 2024-10-17 PROCEDURE — 82607 VITAMIN B-12: CPT

## 2024-10-17 PROCEDURE — 83970 ASSAY OF PARATHORMONE: CPT

## 2024-10-17 PROCEDURE — 83550 IRON BINDING TEST: CPT

## 2024-10-17 PROCEDURE — 84630 ASSAY OF ZINC: CPT

## 2024-10-17 PROCEDURE — 36415 COLL VENOUS BLD VENIPUNCTURE: CPT

## 2024-10-17 PROCEDURE — 84425 ASSAY OF VITAMIN B-1: CPT

## 2024-10-17 PROCEDURE — 84590 ASSAY OF VITAMIN A: CPT

## 2024-10-17 PROCEDURE — 82728 ASSAY OF FERRITIN: CPT

## 2024-10-17 PROCEDURE — 82746 ASSAY OF FOLIC ACID SERUM: CPT

## 2024-10-20 LAB — VIT B1 BLD-SCNC: 156 NMOL/L (ref 66.5–200)

## 2024-10-21 LAB — ZINC SERPL-MCNC: 77 UG/DL (ref 44–115)

## 2024-10-23 ENCOUNTER — TELEPHONE (OUTPATIENT)
Dept: BARIATRICS | Facility: CLINIC | Age: 44
End: 2024-10-23

## 2024-10-23 LAB — VIT A SERPL-MCNC: 48.8 UG/DL (ref 20.1–62)

## 2024-10-23 NOTE — TELEPHONE ENCOUNTER
----- Message from CLIFFORD Valente sent at 10/23/2024  3:12 PM EDT -----  Please call pt to let her know all labs are within limits. Keep up the great work with her vitamins.     MICHAELA

## 2025-03-19 ENCOUNTER — OFFICE VISIT (OUTPATIENT)
Dept: BARIATRICS | Facility: CLINIC | Age: 45
End: 2025-03-19
Payer: COMMERCIAL

## 2025-03-19 VITALS
HEART RATE: 77 BPM | BODY MASS INDEX: 25.66 KG/M2 | DIASTOLIC BLOOD PRESSURE: 70 MMHG | SYSTOLIC BLOOD PRESSURE: 110 MMHG | HEIGHT: 65 IN | WEIGHT: 154 LBS | TEMPERATURE: 98.2 F

## 2025-03-19 DIAGNOSIS — Z48.815 ENCOUNTER FOR SURGICAL AFTERCARE FOLLOWING SURGERY OF DIGESTIVE SYSTEM: Primary | ICD-10-CM

## 2025-03-19 DIAGNOSIS — K91.2 POSTSURGICAL MALABSORPTION: ICD-10-CM

## 2025-03-19 DIAGNOSIS — Z98.84 BARIATRIC SURGERY STATUS: ICD-10-CM

## 2025-03-19 DIAGNOSIS — G47.33 OSA (OBSTRUCTIVE SLEEP APNEA): ICD-10-CM

## 2025-03-19 PROCEDURE — 99213 OFFICE O/P EST LOW 20 MIN: CPT | Performed by: NURSE PRACTITIONER

## 2025-03-19 NOTE — PROGRESS NOTES
Assessment/Plan:     Patient ID: Wen Fuller is a 44 y.o. female.     Bariatric Surgery Status/BMI 26    -s/p Vertical Sleeve Gastrectomy with Dr. Carlton Gibbs on 09/26/2023. Presents to the office today for 18 month post op visit. Overall doing fair - frustrated she is not losing any further weight. Thought surgery would be giving her more weight loss. She was tracking her caloric intake of 800-1000 calories. Currently not active due to busy work life schedule. She is tolerating a regular diet. Denies any abdominal pain, N/V/D/C, regurgitation, reflux or dysphagia. Taking her multivitamins daily.     PLAN:     - encouraged to increase caloric intake and increase physical activity. At this time, she does not qualify for any other interventions such as surgical or AOMs interventions.   - recommended body SECA scan to evaluate metabolism. Recommended to see our RD but would like to defer this for now.   - Routine follow up in 6 months for annual visit  - Continue with healthy lifestyle, adequate protein intake of 60 gm, fluid intake of at least 64 oz.   - Continue with MVI daily.   - Activity as tolerated.   - Labs ordered and will adjust accordingly if any deficiency.   - Follow up with RD and SW as needed.     MAGGY - not on CPAP currently. It was recommended to follow up with sleep medicine to monitor for resolution of MAGGY    Continued/Maintain healthy weight loss with good nutrition intakes.  Adequate hydration with at least 64oz. fluid intake.  Follow diet as discussed.  Follow vitamin and mineral recommendations as reviewed with you.  Exercise as tolerated.    Colonoscopy referral made: n/a  Mammogram - UTD     Follow-up in 6 months for annual visit. We kindly ask that your arrive 15 minutes before your scheduled appointment time with your provider to allow our staff to room you, get your vital signs and update your chart.    Get lab work done prior to visit. Please call the office if you need a script.  It is  recommended to check with your insurance BEFORE getting labs done to make sure they are covered by your policy.      Call our office if you have any problems with abdominal pain especially associated with fever, chills, nausea, vomiting or any other concerns.    All  Post-bariatric surgery patients should be aware that very small quantities of any alcohol can cause impairment and it is very possible not to feel the effect. The effect can be in the system for several hours.  It is also a stomach irritant.     It is advised to AVOID alcohol, Nonsteroidal antiinflammatory drugs (NSAIDS) and nicotine of all forms . Any of these can cause stomach irritation/pain.    Discussed the effects of alcohol on a bariatric patient and the increased impairment risk.     Keep up the good work!     Postsurgical Malabsorption   -At risk for malabsorption of vitamins/minerals secondary to malabsorption and restriction of intake from bariatric surgery  -Currently taking adequate postop bariatric surgery vitamin supplementation  -Last set of bariatric labs completed on 10/2024 and showed WNL  -Next set of bariatric labs ordered for approximately 2 weeks  -Patient received education about the importance of adhering to a lifelong supplementation regimen to avoid vitamin/mineral deficiencies      Diagnoses and all orders for this visit:    Encounter for surgical aftercare following surgery of digestive system    Bariatric surgery status    BMI 26.0-26.9,adult    Postsurgical malabsorption    MAGGY (obstructive sleep apnea)         Subjective:      Patient ID: Wen Fuller is a 44 y.o. female.      -s/p Vertical Sleeve Gastrectomy with Dr. Carlton Gibbs on 09/26/2023. Presents to the office today for 18 month post op visit. Overall doing fair - frustrated she is not losing any further weight. Thought surgery would be giving her more weight loss. She was tracking her caloric intake of 800-1000 calories. Currently not active due to busy work life  "schedule. She is tolerating a regular diet. Denies any abdominal pain, N/V/D/C, regurgitation, reflux or dysphagia. Taking her multivitamins daily.     Initial: 213 lbs  Current: 154 LBS   EWL: (Weight loss is ahead of schedule at this post surgical period.)  Talat: current   Current BMI is Body mass index is 26.03 kg/m².    Tolerating a regular diet-yes  Eating at least 60 grams of protein per day-yes  Following 30/60 minute rule with liquids-yes  Drinking at least 64 ounces of fluid per day-yes   Drinking carbonated beverages-no  Sufficient exercise-no - busy with work; encouraged to increase this.   Using NSAIDs regularly-no  Using nicotine-no  Using alcohol-rarely   Supplements:  bariatric MVI + iron 45 mg + calcium TID + Probiotic    EWL is 89%, which places the patient ahead of schedule for expected post surgical weight loss at this time.     The following portions of the patient's history were reviewed and updated as appropriate: allergies, current medications, past family history, past medical history, past social history, past surgical history and problem list.    Review of Systems   Constitutional: Negative.    Respiratory: Negative.     Cardiovascular: Negative.    Gastrointestinal: Negative.    Musculoskeletal: Negative.    Neurological: Negative.    Psychiatric/Behavioral: Negative.           Objective:    /70 (Patient Position: Standing, Cuff Size: Standard)   Pulse 77   Temp 98.2 °F (36.8 °C) (Tympanic)   Ht 5' 4.5\" (1.638 m)   Wt 69.9 kg (154 lb)   BMI 26.03 kg/m²      Physical Exam  Vitals and nursing note reviewed.   Constitutional:       Appearance: Normal appearance.   Cardiovascular:      Rate and Rhythm: Normal rate and regular rhythm.      Pulses: Normal pulses.      Heart sounds: Normal heart sounds.   Pulmonary:      Effort: Pulmonary effort is normal.      Breath sounds: Normal breath sounds.   Abdominal:      General: Bowel sounds are normal.      Palpations: Abdomen is soft.    "   Tenderness: There is no abdominal tenderness.   Musculoskeletal:         General: Normal range of motion.   Skin:     General: Skin is warm and dry.   Neurological:      General: No focal deficit present.      Mental Status: She is alert and oriented to person, place, and time.   Psychiatric:         Mood and Affect: Mood normal.         Behavior: Behavior normal.         Thought Content: Thought content normal.         Judgment: Judgment normal.

## 2025-03-19 NOTE — PROGRESS NOTES
Date of surgery:9/26/2023  Procedure: Sleeve  Performing surgeon: Dr Carlton Gibbs.    Initial Weight - 213 lb  Current Weight -154 lb  Talat Weight - now  Total Body Weight Loss (EWL)- 59.5%  EWL% - 89%  TWB % -28%

## 2025-06-19 ENCOUNTER — OFFICE VISIT (OUTPATIENT)
Dept: BARIATRICS | Facility: CLINIC | Age: 45
End: 2025-06-19
Payer: COMMERCIAL

## 2025-06-19 VITALS
RESPIRATION RATE: 20 BRPM | TEMPERATURE: 98.1 F | OXYGEN SATURATION: 99 % | WEIGHT: 159 LBS | HEIGHT: 65 IN | SYSTOLIC BLOOD PRESSURE: 116 MMHG | HEART RATE: 73 BPM | DIASTOLIC BLOOD PRESSURE: 72 MMHG | BODY MASS INDEX: 26.49 KG/M2

## 2025-06-19 DIAGNOSIS — Z98.84 HX OF BARIATRIC SURGERY: ICD-10-CM

## 2025-06-19 DIAGNOSIS — E66.3 OVERWEIGHT: Primary | ICD-10-CM

## 2025-06-19 DIAGNOSIS — Z13.29 SCREENING FOR HYPOTHYROIDISM: ICD-10-CM

## 2025-06-19 PROCEDURE — 99214 OFFICE O/P EST MOD 30 MIN: CPT | Performed by: PHYSICIAN ASSISTANT

## 2025-06-19 NOTE — ASSESSMENT & PLAN NOTE
-S/p vertical sleeve gastrectomy  9/2023 with Dr. Carlton fuentes  Initial: 218 lbs (weight day of surgery)  Talat: 154 lbs  Goal weight: 120-130 lbs

## 2025-06-19 NOTE — PATIENT INSTRUCTIONS
Goals:    Food log (ie.) [email or web address],[email or web address],[email or web address],[email or web address],etc.   No sugary beverages. At least 64oz of water daily.  Increase physical activity by 10 minutes daily. Gradually increase physical activity to a goal of 5 days per week for 30 minutes of MODERATE intensity PLUS 2 days per week of FULL BODY 1200 Calories per day  Practice 30/60 rule  Practice lesson plans 1-6 in bariatric manual   Goal protein intake of 60-80 grams per day

## 2025-06-19 NOTE — ASSESSMENT & PLAN NOTE
-Discussed options of HealthyCORE-Intensive Lifestyle Intervention Program, Very Low Calorie Diet-VLCD, and Conservative Program and the role of weight loss medications.  -Initial weight loss goal of 5-10% weight loss for improved health  -Screening labs: recommend update TSH.  -Patient is interested in pursuing Conservative Program  -Calorie goals, sample menu, portion size guidelines, and food logging reviewed with the patient.    -Patient is interested in AOMs. Discussed the importance of consistent dietary and lifestyle changes for long term success. Patient does not currently qualify for AOMs with her current BMI  -Patient also has a history of glaucoma for which she is being treated for. Avoid oral medications  -Suggested patient meet with RD. She defers. Encourged her to track calories 1-2x per week, measure portions, and start incorporating 30 min of cardiovascular exercise 5 days per week.

## 2025-06-19 NOTE — PROGRESS NOTES
Assessment/Plan:    Overweight  -Discussed options of HealthyCORE-Intensive Lifestyle Intervention Program, Very Low Calorie Diet-VLCD, and Conservative Program and the role of weight loss medications.  -Initial weight loss goal of 5-10% weight loss for improved health  -Screening labs: recommend update TSH.  -Patient is interested in pursuing Conservative Program  -Calorie goals, sample menu, portion size guidelines, and food logging reviewed with the patient.    -Patient is interested in AOMs. Discussed the importance of consistent dietary and lifestyle changes for long term success. Patient does not currently qualify for AOMs with her current BMI  -Patient also has a history of glaucoma for which she is being treated for. Avoid oral medications  -Suggested patient meet with RD. She defers. Encourged her to track calories 1-2x per week, measure portions, and start incorporating 30 min of cardiovascular exercise 5 days per week.    Hx of bariatric surgery  -S/p vertical sleeve gastrectomy  9/2023 with Dr. Carlton fuentes  Initial: 218 lbs (weight day of surgery)  Talat: 154 lbs  Goal weight: 120-130 lbs    Goals:    Food log (ie.) [email or web address],[email or web address],loseit.com,calorieking.com,etc.   No sugary beverages. At least 64oz of water daily.  Increase physical activity by 10 minutes daily. Gradually increase physical activity to a goal of 5 days per week for 30 minutes of MODERATE intensity PLUS 2 days per week of FULL BODY 1200 Calories per day  Practice 30/60 rule  Practice lesson plans 1-6 in bariatric manual   Goal protein intake of 60-80 grams per day    Patient will follow up PRN     Diagnoses and all orders for this visit:    Overweight    Screening for hypothyroidism  -     TSH, 3rd generation with Free T4 reflex; Future    Hx of bariatric surgery    BMI 26.0-26.9,adult    Other orders  -     Probiotic Product (PROBIOTIC BLEND PO); Take by mouth          Subjective:   Chief Complaint   Patient presents  with    Consult        Patient ID: Wen Fuller  is a 45 y.o. female with excess weight/obesity here to pursue weight managment.      HPI Patient presenting today for MWM consult. Patient has a history of Vertical sleeve gastrectomy 9/2023 with Dr. Carlton Gibbs. She is frustrated with her weight loss. Reports she stopped losing weight 1 year post op. She still struggles with cravings and food noise as she did prior to surgery    -hx hysterectomy in January. Still has her ovaries    Initial: 213 lbs  Talat: 154 lbs  Goal weight: 120-130 lbs    Hydration: water 60oz  ETOH: once every 6 months  Exercise: denies  Occupation: billing and   Sleep: 7-8 hours, sometimes broken. Hx of Mild MAGGY dx prior to surgery. No longer wears CPAP  Smoking: denies  30/60 min rule: practices    B: Fairlife protein shake   S: watermelon or toast + PB+ banana  L: Salad with chicken + ranch OR lunchable   S: fruit  D: meat +/- veggie ( 3x per week dining out)  S: popsicle       Wt Readings from Last 10 Encounters:   06/19/25 72.1 kg (159 lb)   03/19/25 69.9 kg (154 lb)   09/19/24 69.9 kg (154 lb)   09/19/24 69.9 kg (154 lb)   03/19/24 77.6 kg (171 lb)   12/21/23 84.6 kg (186 lb 8 oz)   10/31/23 90.7 kg (200 lb)   10/05/23 95 kg (209 lb 8 oz)   09/26/23 99.3 kg (218 lb 14.7 oz)   09/07/23 99.8 kg (220 lb)        The following portions of the patient's history were reviewed and updated as appropriate: allergies, current medications, past family history, past medical history, past social history, past surgical history, and problem list.    Review of Systems   Constitutional:  Negative for chills and fever.   HENT:  Negative for sore throat.    Respiratory:  Negative for cough and shortness of breath.    Cardiovascular:  Negative for chest pain and palpitations.   Gastrointestinal:  Positive for diarrhea. Negative for abdominal pain, constipation, nausea and vomiting.   Genitourinary:  Negative for dysuria.   Musculoskeletal:   "Negative for arthralgias and back pain.   Skin:  Negative for rash.   Neurological:  Positive for headaches (occasional). Negative for dizziness.   Psychiatric/Behavioral:  Negative for dysphoric mood. The patient is nervous/anxious.         + increased stress       Objective:    /72 (BP Location: Right arm, Patient Position: Sitting, Cuff Size: Standard)   Pulse 73   Temp 98.1 °F (36.7 °C) (Temporal)   Resp 20   Ht 5' 4.5\" (1.638 m)   Wt 72.1 kg (159 lb)   SpO2 99%   BMI 26.87 kg/m²      Physical Exam  Vitals and nursing note reviewed.   Constitutional:       General: She is not in acute distress.     Appearance: Normal appearance. She is not ill-appearing or toxic-appearing.   HENT:      Head: Normocephalic and atraumatic.      Nose: Nose normal.      Mouth/Throat:      Mouth: Mucous membranes are moist.     Eyes:      General: No scleral icterus.      Cardiovascular:      Comments: Well perfused  Pulmonary:      Effort: Pulmonary effort is normal. No respiratory distress.     Musculoskeletal:         General: Normal range of motion.      Cervical back: Neck supple.      Right lower leg: No edema.      Left lower leg: No edema.     Skin:     General: Skin is dry.      Coloration: Skin is not jaundiced.     Neurological:      General: No focal deficit present.      Mental Status: She is alert and oriented to person, place, and time. Mental status is at baseline.     Psychiatric:         Mood and Affect: Mood normal.         Behavior: Behavior normal.         Thought Content: Thought content normal.         Judgment: Judgment normal.        "

## (undated) DEVICE — TUBE SET SMOKE EVAC PNEUMOCLEAR HIGH FLOW

## (undated) DEVICE — ELECTRO LUBE IS A SINGLE PATIENT USE DEVICE THAT IS INTENDED TO BE USED ON ELECTROSURGICAL ELECTRODES TO REDUCE STICKING.: Brand: KEY SURGICAL ELECTRO LUBE

## (undated) DEVICE — PLUMEPEN PRO 10FT

## (undated) DEVICE — VISIGI 3D®  CALIBRATION SYSTEM  SIZE 36FR SLEEVE/STD: Brand: BOEHRINGER® VISIGI 3D™ SLEEVE GASTRECTOMY CALIBRATION SYSTEM, SIZE 36FR

## (undated) DEVICE — SUT ETHIBOND 0 CT-1 30 IN X424H

## (undated) DEVICE — ABSORBABLE WOUND CLOSURE DEVICE: Brand: V-LOC 90

## (undated) DEVICE — DISPOSABLE OR TOWEL: Brand: CARDINAL HEALTH

## (undated) DEVICE — STAPLER 60: Brand: SUREFORM

## (undated) DEVICE — ARM DRAPE

## (undated) DEVICE — SUT MONOCRYL 4-0 PS-2 27 IN Y426H

## (undated) DEVICE — DRAPE EQUIPMENT RF WAND

## (undated) DEVICE — VIOLET BRAIDED (POLYGLACTIN 910), SYNTHETIC ABSORBABLE SUTURE: Brand: COATED VICRYL

## (undated) DEVICE — DECANTER: Brand: UNBRANDED

## (undated) DEVICE — TIP COVER ACCESSORY

## (undated) DEVICE — TROCAR: Brand: KII FIOS FIRST ENTRY

## (undated) DEVICE — STRL COTTON TIP APPLCTR 6IN PK: Brand: CARDINAL HEALTH

## (undated) DEVICE — 4-PORT MANIFOLD: Brand: NEPTUNE 2

## (undated) DEVICE — ASTOUND STANDARD SURGICAL GOWN, XL: Brand: CONVERTORS

## (undated) DEVICE — STAPLER CANNULA SEAL: Brand: ENDOWRIST

## (undated) DEVICE — BLADELESS OBTURATOR: Brand: WECK VISTA

## (undated) DEVICE — SCD SEQUENTIAL COMPRESSION COMFORT SLEEVE MEDIUM KNEE LENGTH: Brand: KENDALL SCD

## (undated) DEVICE — MONOPOLAR CURVED SCISSORS: Brand: ENDOWRIST

## (undated) DEVICE — COLUMN DRAPE

## (undated) DEVICE — REDUCER: Brand: ENDOWRIST

## (undated) DEVICE — GLOVE SRG BIOGEL 7.5

## (undated) DEVICE — STRL PENROSE DRAIN 18" X 1/2": Brand: CARDINAL HEALTH

## (undated) DEVICE — CADIERE FORCEPS: Brand: ENDOWRIST

## (undated) DEVICE — STAPLER 60 RELOAD WHITE: Brand: SUREFORM

## (undated) DEVICE — STAPLER 60 RELOAD BLUE: Brand: SUREFORM

## (undated) DEVICE — CANNULA SEAL

## (undated) DEVICE — VESSEL SEALER EXTEND: Brand: ENDOWRIST

## (undated) DEVICE — ADHESIVE SKIN HIGH VISCOSITY EXOFIN 1ML

## (undated) DEVICE — KIT, ROBOTIC BARIATRIC: Brand: CARDINAL HEALTH

## (undated) DEVICE — MEGA SUTURECUT ND: Brand: ENDOWRIST